# Patient Record
Sex: FEMALE | Race: WHITE | NOT HISPANIC OR LATINO | Employment: OTHER | URBAN - METROPOLITAN AREA
[De-identification: names, ages, dates, MRNs, and addresses within clinical notes are randomized per-mention and may not be internally consistent; named-entity substitution may affect disease eponyms.]

---

## 2017-02-13 ENCOUNTER — LAB VISIT (OUTPATIENT)
Dept: LAB | Facility: HOSPITAL | Age: 67
End: 2017-02-13
Attending: INTERNAL MEDICINE
Payer: MEDICARE

## 2017-02-13 ENCOUNTER — OFFICE VISIT (OUTPATIENT)
Dept: RHEUMATOLOGY | Facility: CLINIC | Age: 67
End: 2017-02-13
Payer: MEDICARE

## 2017-02-13 VITALS — WEIGHT: 256 LBS | BODY MASS INDEX: 37.92 KG/M2 | HEIGHT: 69 IN

## 2017-02-13 DIAGNOSIS — Z79.52 LONG TERM CURRENT USE OF SYSTEMIC STEROIDS: ICD-10-CM

## 2017-02-13 DIAGNOSIS — Z79.631 LONG TERM METHOTREXATE USER: ICD-10-CM

## 2017-02-13 DIAGNOSIS — D68.61 ANTI-PHOSPHOLIPID SYNDROME: ICD-10-CM

## 2017-02-13 DIAGNOSIS — M05.79 RHEUMATOID ARTHRITIS INVOLVING MULTIPLE SITES WITH POSITIVE RHEUMATOID FACTOR: ICD-10-CM

## 2017-02-13 DIAGNOSIS — E55.9 VITAMIN D INSUFFICIENCY: ICD-10-CM

## 2017-02-13 DIAGNOSIS — M15.9 GENERALIZED OSTEOARTHRITIS OF MULTIPLE SITES: ICD-10-CM

## 2017-02-13 DIAGNOSIS — M05.79 RHEUMATOID ARTHRITIS INVOLVING MULTIPLE SITES WITH POSITIVE RHEUMATOID FACTOR: Primary | ICD-10-CM

## 2017-02-13 DIAGNOSIS — E66.9 OBESITY (BMI 30-39.9): ICD-10-CM

## 2017-02-13 LAB
ALBUMIN SERPL BCP-MCNC: 3.6 G/DL
ALP SERPL-CCNC: 85 U/L
ALT SERPL W/O P-5'-P-CCNC: 17 U/L
ANION GAP SERPL CALC-SCNC: 13 MMOL/L
AST SERPL-CCNC: 22 U/L
BASOPHILS # BLD AUTO: 0.04 K/UL
BASOPHILS NFR BLD: 0.6 %
BILIRUB SERPL-MCNC: 0.4 MG/DL
BUN SERPL-MCNC: 12 MG/DL
CALCIUM SERPL-MCNC: 9.8 MG/DL
CHLORIDE SERPL-SCNC: 106 MMOL/L
CO2 SERPL-SCNC: 23 MMOL/L
CREAT SERPL-MCNC: 0.7 MG/DL
CRP SERPL-MCNC: 36.8 MG/L
DIFFERENTIAL METHOD: ABNORMAL
EOSINOPHIL # BLD AUTO: 0.6 K/UL
EOSINOPHIL NFR BLD: 10.1 %
ERYTHROCYTE [DISTWIDTH] IN BLOOD BY AUTOMATED COUNT: 15.6 %
ERYTHROCYTE [SEDIMENTATION RATE] IN BLOOD BY WESTERGREN METHOD: 25 MM/HR
EST. GFR  (AFRICAN AMERICAN): >60 ML/MIN/1.73 M^2
EST. GFR  (NON AFRICAN AMERICAN): >60 ML/MIN/1.73 M^2
GLUCOSE SERPL-MCNC: 89 MG/DL
HCT VFR BLD AUTO: 41.1 %
HGB BLD-MCNC: 12.7 G/DL
LYMPHOCYTES # BLD AUTO: 1.3 K/UL
LYMPHOCYTES NFR BLD: 20.8 %
MCH RBC QN AUTO: 30.9 PG
MCHC RBC AUTO-ENTMCNC: 30.9 %
MCV RBC AUTO: 100 FL
MONOCYTES # BLD AUTO: 0.7 K/UL
MONOCYTES NFR BLD: 11.5 %
NEUTROPHILS # BLD AUTO: 3.5 K/UL
NEUTROPHILS NFR BLD: 56.7 %
PLATELET # BLD AUTO: 247 K/UL
PMV BLD AUTO: 9.9 FL
POTASSIUM SERPL-SCNC: 4.3 MMOL/L
PROT SERPL-MCNC: 7 G/DL
RBC # BLD AUTO: 4.11 M/UL
SODIUM SERPL-SCNC: 142 MMOL/L
WBC # BLD AUTO: 6.25 K/UL

## 2017-02-13 PROCEDURE — 1125F AMNT PAIN NOTED PAIN PRSNT: CPT | Mod: S$GLB,,, | Performed by: INTERNAL MEDICINE

## 2017-02-13 PROCEDURE — 99999 PR PBB SHADOW E&M-EST. PATIENT-LVL III: CPT | Mod: PBBFAC,,, | Performed by: INTERNAL MEDICINE

## 2017-02-13 PROCEDURE — 1159F MED LIST DOCD IN RCRD: CPT | Mod: S$GLB,,, | Performed by: INTERNAL MEDICINE

## 2017-02-13 PROCEDURE — 99214 OFFICE O/P EST MOD 30 MIN: CPT | Mod: S$GLB,,, | Performed by: INTERNAL MEDICINE

## 2017-02-13 PROCEDURE — 1157F ADVNC CARE PLAN IN RCRD: CPT | Mod: S$GLB,,, | Performed by: INTERNAL MEDICINE

## 2017-02-13 PROCEDURE — 1160F RVW MEDS BY RX/DR IN RCRD: CPT | Mod: S$GLB,,, | Performed by: INTERNAL MEDICINE

## 2017-02-13 RX ORDER — KETOTIFEN FUMARATE 0.35 MG/ML
1 SOLUTION/ DROPS OPHTHALMIC 2 TIMES DAILY
COMMUNITY
End: 2018-01-02

## 2017-02-13 RX ORDER — TRAMADOL HYDROCHLORIDE 50 MG/1
TABLET ORAL
Qty: 120 TABLET | Refills: 0 | Status: SHIPPED | OUTPATIENT
Start: 2017-02-13 | End: 2017-03-17 | Stop reason: DRUGHIGH

## 2017-02-13 RX ORDER — PREDNISONE 5 MG/1
5 TABLET ORAL DAILY
Qty: 90 TABLET | Refills: 1 | Status: SHIPPED | OUTPATIENT
Start: 2017-02-13 | End: 2017-10-09 | Stop reason: SDUPTHER

## 2017-02-13 RX ORDER — CEFUROXIME AXETIL 500 MG/1
500 TABLET ORAL 2 TIMES DAILY
COMMUNITY
End: 2018-01-02 | Stop reason: ALTCHOICE

## 2017-02-13 NOTE — MR AVS SNAPSHOT
[facility]  [address]  [facility] [address]  Phone: [phone]  Fax: [phone]                  Cata Peoples   2017 2:00 PM   Office Visit    Description:  Female : 1950   Provider:  Rocio Argueta MD   Department:  [facility]           Reason for Visit     Disease Management           Diagnoses this Visit        Comments    Rheumatoid arthritis involving multiple sites with positive rheumatoid factor    -  Primary     Anti-phospholipid syndrome         Generalized osteoarthritis of multiple sites         Long term current use of systemic steroids         Vitamin D insufficiency         Long term methotrexate user         Obesity (BMI 30-39.9)                To Do List           Goals (5 Years of Data)     None      Follow-Up and Disposition     Return in about 6 months (around 2017).       These Medications        Disp Refills Start End    predniSONE (DELTASONE) 5 MG tablet 90 tablet 1 2017     Take 1 tablet (5 mg total) by mouth once daily. - Oral    Pharmacy: [facility]/pharmacy #[id number] - [place], [place] - [address] [facility] #: [phone]         [facility] On Call     [facility] On Call Nurse Care Line -  Assistance  Registered nurses in the [facility] On Call Center provide clinical advisement, health education, appointment booking, and other advisory services.  Call for this free service at [phone].             Medications           Message regarding Medications     Verify the changes and/or additions to your medication regime listed below are the same as discussed with your clinician today.  If any of these changes or additions are incorrect, please notify your healthcare provider.        CHANGE how you are taking these medications     Start Taking Instead of    predniSONE (DELTASONE) 5 MG tablet predniSONE (DELTASONE) 5 MG tablet    Dosage:  Take 1 tablet (5 mg total) by mouth once daily. Dosage:   Take 3 tablets (15 mg total) by mouth once daily.    Reason for Change:  Reorder       STOP taking these medications     levoFLOXacin (LEVAQUIN) 750 MG tablet Take 750 mg by mouth once daily.           Verify that the below list of medications is an accurate representation of the medications you are currently taking.  If none reported, the list may be blank. If incorrect, please contact your healthcare provider. Carry this list with you in case of emergency.           Current Medications     ascorbic acid (VITAMIN C) 1000 MG tablet Take 1,000 mg by mouth once daily.    cefUROXime (CEFTIN) 500 MG tablet Take 500 mg by mouth 2 (two) times daily.    FLUZONE HIGH-DOSE 2016-17, PF, 180 mcg/0.5 mL Syrg TO BE ADMINISTERED BY PHARMACIST FOR IMMUNIZATION    hydrocodone-acetaminophen 5-325mg (NORCO) 5-325 mg per tablet     ketotifen (ZADITOR) 0.025 % (0.035 %) ophthalmic solution Place 1 drop into both eyes 2 (two) times daily.    latanoprost 0.005 % ophthalmic solution Place 1 drop into both eyes nightly.    methotrexate 2.5 MG Tab TAKE 6 TABLETS (15 MG TOTAL) BY MOUTH EVERY 7 DAYS.    metoprolol tartrate (LOPRESSOR) 50 MG tablet Take 50 mg by mouth 2 (two) times daily.    multivitamin (ONE DAILY MULTIVITAMIN) per tablet Take 1 tablet by mouth once daily.    omeprazole (PRILOSEC) 40 MG capsule Take 1 capsule (40 mg total) by mouth once daily.    predniSONE (DELTASONE) 5 MG tablet Take 1 tablet (5 mg total) by mouth once daily.    propafenone (RHTHYMOL) 150 MG Tab 150 mg every 8 (eight) hours.     tramadol (ULTRAM) 50 mg tablet TAKE 1 TABLET BY MOUTH EVERY 6 HOURS AS NEEDED FOR PAIN    vitamin D 1000 units Tab Take 185 mg by mouth once daily.    warfarin (COUMADIN) 4 MG tablet     folic acid (FOLVITE) 1 MG tablet Take 1 tablet (1 mg total) by mouth once daily.    promethazine-codeine 6.25-10 mg/5 ml (PHENERGAN WITH CODEINE) 6.25-10 mg/5 mL syrup            Clinical Reference Information           Your Vitals Were     Height  "Weight BMI          5' 9" (1.753 m) 116.1 kg (256 lb) 37.8 kg/m2        Allergies as of 2/13/2017     Ambien [Zolpidem]      Immunizations Administered on Date of Encounter - 2/13/2017     None      Orders Placed During Today's Visit     Future Labs/Procedures Expected by Expires    DXA Bone Density Spine And Hip_Axial Skeleton  2/13/2017 2/13/2018    Vitamin D  2/13/2017 4/14/2018    XR Arthritis Survey  2/13/2017 2/13/2018    Recurring Lab Work Interval Expires    C-reactive protein   2/13/2018    CBC auto differential   2/13/2018    Comprehensive metabolic panel   2/13/2018    Sedimentation rate, manual   2/13/2018      Instructions    Please contact us if your joint issues change.  Keep up with your labs.        Language Assistance Services     ATTENTION: Language assistance services are available, free of charge. Please call [phone].      ATENCIÓN: Si habla español, tiene a capps disposición servicios gratuitos de asistencia lingüística. Llame al [phone].     DIRK Ý: N?u b?n nói Ti?ng Vi?t, có các d?ch v? h? tr? ngôn ng? mi?n phí dành cho b?n. G?i s? [phone].         [facility] complies with applicable Federal civil rights laws and does not discriminate on the basis of race, color, national origin, age, disability, or sex.        "

## 2017-02-13 NOTE — PROGRESS NOTES
Subjective:       Patient ID: Cata Peoples is a 66 y.o. female with sero+ccp+RA and APS (xarelto)    Chief Complaint: No chief complaint on file.    Returns for follow-up. Last seen on 11/1/16. Taking MTX and prednisone. Still doing very well RA wise. No joint pain other than the chronic LBP. Denies Raynaud's, dysphagia, tight skin, alopecia, oral ulcers, dry eyes, pleurisy, pericarditis, photosensitivity, skin rashes, new thromboses (had DVT behind right knee in May 2015 while on xarelto; is now on warfarin). AM stiffness (in low back mostly) x minimal. Tolerating MTX well. On prednisone 2.5 mg.  Does not want to change meds but would like something stronger for low back pain. Taking tramadol 50 mg & it helps just a little bit.  She usually takes them twice a day.  Would like to take more or something else for pain. Her main complaint is still low back pain. It's better now as it does not occur at rest but is there with walking and is her worse problem. She cannot walk much. Has had chronic low back pain and had injections in the past (summer of 2015 x 2) and they did not help. Also c/o financial issues. Pays $50 to see a specialist. Would like to move out her apts to every 6 months if possible.     Currently suffering from sinusitis and has facial pain and is on an antibiotic. No fever. No chills.  Otherwise she's ok.                Associated symptoms include fatigue and headaches. Pertinent negatives include no fever.         Current Outpatient Prescriptions   Medication Sig Dispense Refill    ascorbic acid (VITAMIN C) 1000 MG tablet Take 1,000 mg by mouth once daily.      cefUROXime (CEFTIN) 500 MG tablet Take 500 mg by mouth 2 (two) times daily.      FLUZONE HIGH-DOSE 2016-17, PF, 180 mcg/0.5 mL Syrg TO BE ADMINISTERED BY PHARMACIST FOR IMMUNIZATION  0    hydrocodone-acetaminophen 5-325mg (NORCO) 5-325 mg per tablet   0    ketotifen (ZADITOR) 0.025 % (0.035 %) ophthalmic solution Place 1 drop into  both eyes 2 (two) times daily.      latanoprost 0.005 % ophthalmic solution Place 1 drop into both eyes nightly.  12    methotrexate 2.5 MG Tab TAKE 6 TABLETS (15 MG TOTAL) BY MOUTH EVERY 7 DAYS. 90 tablet 3    metoprolol tartrate (LOPRESSOR) 50 MG tablet Take 50 mg by mouth 2 (two) times daily.  5    multivitamin (ONE DAILY MULTIVITAMIN) per tablet Take 1 tablet by mouth once daily.      omeprazole (PRILOSEC) 40 MG capsule Take 1 capsule (40 mg total) by mouth once daily. 30 capsule 3    predniSONE (DELTASONE) 5 MG tablet Take 3 tablets (15 mg total) by mouth once daily. 90 tablet 0    propafenone (RHTHYMOL) 150 MG Tab 150 mg every 8 (eight) hours.       tramadol (ULTRAM) 50 mg tablet TAKE 1 TABLET BY MOUTH EVERY 6 HOURS AS NEEDED FOR PAIN 120 tablet 0    vitamin D 1000 units Tab Take 185 mg by mouth once daily.      warfarin (COUMADIN) 4 MG tablet   3    folic acid (FOLVITE) 1 MG tablet Take 1 tablet (1 mg total) by mouth once daily. 90 tablet 3    promethazine-codeine 6.25-10 mg/5 ml (PHENERGAN WITH CODEINE) 6.25-10 mg/5 mL syrup        No current facility-administered medications for this visit.    Only taking prednisone 2.5 mg/day.        Review of patient's allergies indicates:   Allergen Reactions    Ambien [zolpidem]      Hallucinations     Past Medical History   Diagnosis Date    *Atrial fibrillation     Acid reflux     Allergy     Anxiety     Arthritis     Deep vein thrombosis     Depression     Pulmonary embolism      Past Surgical History   Procedure Laterality Date    Joint replacement       knee x4 replacements-2 R, and 2 L knee         Review of Systems   Constitutional: Positive for fatigue. Negative for diaphoresis, fever and unexpected weight change.   HENT: Positive for sinus pressure and tinnitus. Negative for dental problem, mouth sores and nosebleeds.         Dry mouth   Eyes: Negative.    Respiratory: Negative.  Negative for cough and chest tightness.    Cardiovascular:  "Negative.  Negative for leg swelling.   Gastrointestinal: Negative.  Negative for blood in stool, constipation, diarrhea and vomiting.   Endocrine: Negative.    Genitourinary: Positive for enuresis.        Overactive bladder; recurrent asymptomatic UTIs.   Musculoskeletal: Positive for back pain.   Skin: Negative.         Ecchymoses  Dry skin   Allergic/Immunologic: Negative.    Neurological: Positive for headaches.   Hematological: Bruises/bleeds easily.   Psychiatric/Behavioral: Negative.  Negative for dysphoric mood and sleep disturbance. The patient is not nervous/anxious.        Family History   Problem Relation Age of Onset    Cancer Mother      lung    Heart disease Father     Kidney disease Father      Social History   Substance Use Topics    Smoking status: Never Smoker    Smokeless tobacco: Never Used    Alcohol use No   Awaiting the birth of another grandchild around 1/6/17 & hopes it's a girl.      Objective:     Visit Vitals    Ht 5' 9" (1.753 m)    Wt 116.1 kg (256 lb)    BMI 37.8 kg/m2       Physical Exam   Constitutional: She is oriented to person, place, and time and well-developed, well-nourished, and in no distress. No distress.   HENT:   Head: Normocephalic and atraumatic.   Mouth/Throat: Oropharynx is clear and moist. No oropharyngeal exudate.   Wearing wig.     Eyes: Conjunctivae and EOM are normal. Pupils are equal, round, and reactive to light. No scleral icterus.   Neck: Neck supple. No JVD present. No tracheal deviation present. No thyromegaly present.   Cardiovascular: Normal rate, regular rhythm, normal heart sounds and intact distal pulses.  Exam reveals no gallop and no friction rub.    No murmur heard.  Pulmonary/Chest: Effort normal. No respiratory distress. She has no wheezes. She has no rales. She exhibits no tenderness.   Abdominal: Soft. She exhibits no distension and no mass. There is no splenomegaly or hepatomegaly. There is no tenderness. There is no rebound and no " guarding.   Lymphadenopathy:     She has no cervical adenopathy.        Right: No inguinal adenopathy present.        Left: No inguinal adenopathy present.   Neurological: She is alert and oriented to person, place, and time. She has normal reflexes. She displays normal reflexes. No cranial nerve deficit. She exhibits normal muscle tone.   Skin: Skin is warm and dry. She is not diaphoretic.     Psychiatric: Mood, memory, affect and judgment normal.   Musculoskeletal: She exhibits edema (1+ pedal & pretibial) and tenderness (R mid buttock).     Bilateral crepitus TMJ. Mild decrease in   rotation of the cervical spine. Shoulders are unremarkable. Elbows with mild R flexion contraction; minimal decrease in extension on L; Left wrist & 2,3 & 4th joints MCPs minimal SHT; PIPs with prominent Fam nodes and zigzagging. DIPs with prominent Heberden nodes and zigzagging.  Right hip with scar and with limitation of internal, external, rotation, flexion. Left also with some limitation to lesser degree.Knees with bilateral scars and bony hypertrophy and   crepitus with no synovitis. Ankles are with limited motion. No tenderness. Heels and toes are without metatarsalgia. Lumbar spine still with good flexion.               LABS:   2/13/17: CBC eos 10.1%;   11/1/16: ESR 12; CRP 4.4; CBC ok; CMP ok;   7/26/16: CPR 11.9; CBC ok; CMP ok;  1/6/16: ESR 23; CRP 5.7; Hg 11.6; CMP ok;   3/17/15: ESR 25; CRP 14.7; Hg 11; Ht 34.7; Alb. 3.3  12/17/14: ESR18;CRP 4; CBC ok; CMP ok; Vit D 23  9/17/14: ESR 15; CRP 4.9; CBC ok; CMP ok;   6/18/14: ESR 22; CRP 3.7; Vit D 26; +LAC; neg aCLA & beta 2 glyco;   12/9/13: ESR 30; CRP 20.5; CBC, CMP ok; Vit D 31;   9/26/23: +LAC  9/23/13: ESR 10; CRP 4.4; CBC, CMP ok;  6/20/13: ESR 30; CRP 18.4; CBC, CMP ok;   LAC neg; HEX +; aCLA & beta 2 neg;       DXA: 1/20/15: TLS 1.1; TFN -0.6; TTH -0.7    6/18/14: Arthritis Survey personally reviewed: OA & RA with Bilateral TKRs with ? loosening L TKR;   MRI  from 12/12: Extensive pannus in the right hip joint, suggesting rheumatoid arthritis. There is prominent associated marrow edema within the femoral head and a portion of the acetabulum. Small focus of decreased T1/T2 signal in the right femoral head, suggesting avascular necrosis (less than 25% involvement of the femoral head). Mild pannus in the left hip joint. No MR findings of osteonecrosis in the left femoral head.    Assessment:   Seropositive and CCP positive rheumatoid arthritis involving hands--very stable,    feet, elbows, and wrists, well controlled on prednisone 5/2.5 mg/d, MTX 15/wk and folic acid 1 mg/d.   Off Enbrel since 12/14.    Had multiple UTIs on it & cellulitis of one of her legs.    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     No hx of miscarriages or other thrombotic event   +LAC (neg aCLA & beta 2 glyc);      On coumadin; off xarelto due to cost.    Pedal edema   Due to venous insufficiency & weight.     Vitamin D insufficiency--recurrent;    normal DXA      Generalized osteoarthritis:   Low back pain secondary to degenerative disk disease especially involving L2-L3, but also L3-L4 with scoliosis and retrolisthesis.     Also with sacroiliac area tender points.   Status post bilateral knee replacements with repeat revision on the right January 2010 requiring blood transfusion.    S/P R THR 3/18/14.   Severe degenerative joint disease of the hands.     Fully functional    Depressive disorder    Hypertension.     GERD     Obesity       Plan:   Continue MTX 15 mg/wk & folic acid 1 mg/day.  Continue prednisone 2.5 mg daily.  Ok to double up on tramadol (take 2 twice a day) empirically and see how she does. She knows to back off if it makes her drowsy, etc.  Labs in 3 & 6 months including vitamin D level.  Schedule DXA and Arthritis Survey for next visit.  Ok to see her every  6 months for cost containment.  RTC 6 months or prn.

## 2017-02-27 DIAGNOSIS — M05.79 RHEUMATOID ARTHRITIS INVOLVING MULTIPLE SITES WITH POSITIVE RHEUMATOID FACTOR: ICD-10-CM

## 2017-02-27 RX ORDER — METHOTREXATE 2.5 MG/1
TABLET ORAL
Qty: 90 TABLET | Refills: 3 | Status: SHIPPED | OUTPATIENT
Start: 2017-02-27 | End: 2018-01-30 | Stop reason: SDUPTHER

## 2017-03-14 DIAGNOSIS — M05.79 RHEUMATOID ARTHRITIS INVOLVING MULTIPLE SITES WITH POSITIVE RHEUMATOID FACTOR: ICD-10-CM

## 2017-03-14 DIAGNOSIS — K21.9 GASTROESOPHAGEAL REFLUX DISEASE WITHOUT ESOPHAGITIS: ICD-10-CM

## 2017-03-14 RX ORDER — OMEPRAZOLE 40 MG/1
40 CAPSULE, DELAYED RELEASE ORAL DAILY
Qty: 30 CAPSULE | Refills: 3 | Status: SHIPPED | OUTPATIENT
Start: 2017-03-14 | End: 2017-07-17 | Stop reason: SDUPTHER

## 2017-03-14 RX ORDER — FOLIC ACID 1 MG/1
1 TABLET ORAL DAILY
Qty: 90 TABLET | Refills: 3 | Status: SHIPPED | OUTPATIENT
Start: 2017-03-14 | End: 2018-03-06 | Stop reason: SDUPTHER

## 2017-03-17 ENCOUNTER — TELEPHONE (OUTPATIENT)
Dept: RHEUMATOLOGY | Facility: CLINIC | Age: 67
End: 2017-03-17

## 2017-03-17 DIAGNOSIS — M25.50 PAIN, JOINT, MULTIPLE SITES: Primary | ICD-10-CM

## 2017-03-17 RX ORDER — TRAMADOL HYDROCHLORIDE 50 MG/1
TABLET ORAL
Qty: 240 TABLET | Refills: 3 | Status: SHIPPED | OUTPATIENT
Start: 2017-03-17 | End: 2017-10-19 | Stop reason: SDUPTHER

## 2017-03-17 NOTE — TELEPHONE ENCOUNTER
----- Message from Madhuri Corcoran sent at 3/17/2017  9:52 AM CDT -----  Contact: self@[employer]  Pt called in requesting a call back. Pt stated that the Tramadol she was prescribed has been helping a lot and she needs a new Rx for the higher dosage sent to her pharmacy, [facility] [address] in [place].    Call back is [phone]    Thank you

## 2017-03-17 NOTE — TELEPHONE ENCOUNTER
Please send tramadol [facility]mg tabs to [facility]      Returned call but no answer.    There are no 100 mg tabs (those are extended release and are more expensive).     She should use this drug sparingly as it is a narcotic and can cause addiction, drowsiness, and other side effects--some very serious.  While the maximum dose at her age is 8/day, I do not want her taking that much.  I will. She should not take any other narcotics with it including phenergan.

## 2017-05-19 ENCOUNTER — LAB VISIT (OUTPATIENT)
Dept: LAB | Facility: HOSPITAL | Age: 67
End: 2017-05-19
Attending: INTERNAL MEDICINE
Payer: MEDICARE

## 2017-05-19 DIAGNOSIS — E55.9 VITAMIN D INSUFFICIENCY: ICD-10-CM

## 2017-05-19 LAB — 25(OH)D3+25(OH)D2 SERPL-MCNC: 48 NG/ML

## 2017-05-19 PROCEDURE — 82306 VITAMIN D 25 HYDROXY: CPT

## 2017-05-19 PROCEDURE — 36415 COLL VENOUS BLD VENIPUNCTURE: CPT

## 2017-07-17 DIAGNOSIS — K21.9 GASTROESOPHAGEAL REFLUX DISEASE WITHOUT ESOPHAGITIS: ICD-10-CM

## 2017-07-17 RX ORDER — OMEPRAZOLE 40 MG/1
40 CAPSULE, DELAYED RELEASE ORAL DAILY
Qty: 30 CAPSULE | Refills: 3 | Status: SHIPPED | OUTPATIENT
Start: 2017-07-17 | End: 2017-11-15 | Stop reason: SDUPTHER

## 2017-08-16 ENCOUNTER — OFFICE VISIT (OUTPATIENT)
Dept: RHEUMATOLOGY | Facility: CLINIC | Age: 67
End: 2017-08-16
Payer: MEDICARE

## 2017-08-16 ENCOUNTER — LAB VISIT (OUTPATIENT)
Dept: LAB | Facility: HOSPITAL | Age: 67
End: 2017-08-16
Attending: INTERNAL MEDICINE
Payer: MEDICARE

## 2017-08-16 VITALS — WEIGHT: 243.31 LBS | HEIGHT: 69 IN | BODY MASS INDEX: 36.04 KG/M2

## 2017-08-16 DIAGNOSIS — Z79.631 LONG TERM METHOTREXATE USER: ICD-10-CM

## 2017-08-16 DIAGNOSIS — D68.61 ANTI-PHOSPHOLIPID SYNDROME: ICD-10-CM

## 2017-08-16 DIAGNOSIS — M54.50 ACUTE LEFT-SIDED LOW BACK PAIN WITHOUT SCIATICA: ICD-10-CM

## 2017-08-16 DIAGNOSIS — M05.79 RHEUMATOID ARTHRITIS INVOLVING MULTIPLE SITES WITH POSITIVE RHEUMATOID FACTOR: ICD-10-CM

## 2017-08-16 DIAGNOSIS — M05.79 RHEUMATOID ARTHRITIS INVOLVING MULTIPLE SITES WITH POSITIVE RHEUMATOID FACTOR: Primary | ICD-10-CM

## 2017-08-16 DIAGNOSIS — M15.9 GENERALIZED OSTEOARTHRITIS OF MULTIPLE SITES: ICD-10-CM

## 2017-08-16 DIAGNOSIS — E66.9 OBESITY (BMI 30-39.9): ICD-10-CM

## 2017-08-16 DIAGNOSIS — Z79.52 LONG TERM CURRENT USE OF SYSTEMIC STEROIDS: ICD-10-CM

## 2017-08-16 LAB
ALBUMIN SERPL BCP-MCNC: 3.6 G/DL
ALP SERPL-CCNC: 89 U/L
ALT SERPL W/O P-5'-P-CCNC: 12 U/L
ANION GAP SERPL CALC-SCNC: 10 MMOL/L
AST SERPL-CCNC: 18 U/L
BASOPHILS # BLD AUTO: 0.05 K/UL
BASOPHILS NFR BLD: 0.6 %
BILIRUB SERPL-MCNC: 0.7 MG/DL
BUN SERPL-MCNC: 21 MG/DL
CALCIUM SERPL-MCNC: 10.2 MG/DL
CHLORIDE SERPL-SCNC: 107 MMOL/L
CO2 SERPL-SCNC: 24 MMOL/L
CREAT SERPL-MCNC: 0.8 MG/DL
CRP SERPL-MCNC: 46.3 MG/L
DIFFERENTIAL METHOD: ABNORMAL
EOSINOPHIL # BLD AUTO: 0.3 K/UL
EOSINOPHIL NFR BLD: 3.3 %
ERYTHROCYTE [DISTWIDTH] IN BLOOD BY AUTOMATED COUNT: 16 %
ERYTHROCYTE [SEDIMENTATION RATE] IN BLOOD BY WESTERGREN METHOD: 36 MM/HR
EST. GFR  (AFRICAN AMERICAN): >60 ML/MIN/1.73 M^2
EST. GFR  (NON AFRICAN AMERICAN): >60 ML/MIN/1.73 M^2
GLUCOSE SERPL-MCNC: 89 MG/DL
HCT VFR BLD AUTO: 38 %
HGB BLD-MCNC: 12.2 G/DL
LYMPHOCYTES # BLD AUTO: 0.8 K/UL
LYMPHOCYTES NFR BLD: 10.5 %
MCH RBC QN AUTO: 31.1 PG
MCHC RBC AUTO-ENTMCNC: 32.1 G/DL
MCV RBC AUTO: 97 FL
MONOCYTES # BLD AUTO: 1.1 K/UL
MONOCYTES NFR BLD: 13.5 %
NEUTROPHILS # BLD AUTO: 5.8 K/UL
NEUTROPHILS NFR BLD: 72 %
PLATELET # BLD AUTO: 251 K/UL
PMV BLD AUTO: 9.5 FL
POTASSIUM SERPL-SCNC: 4.2 MMOL/L
PROT SERPL-MCNC: 7.2 G/DL
RBC # BLD AUTO: 3.92 M/UL
SODIUM SERPL-SCNC: 141 MMOL/L
WBC # BLD AUTO: 7.99 K/UL

## 2017-08-16 PROCEDURE — 99214 OFFICE O/P EST MOD 30 MIN: CPT | Mod: S$GLB,,, | Performed by: INTERNAL MEDICINE

## 2017-08-16 PROCEDURE — 99999 PR PBB SHADOW E&M-EST. PATIENT-LVL III: CPT | Mod: PBBFAC,,, | Performed by: INTERNAL MEDICINE

## 2017-08-16 PROCEDURE — 3008F BODY MASS INDEX DOCD: CPT | Mod: S$GLB,,, | Performed by: INTERNAL MEDICINE

## 2017-08-16 PROCEDURE — 1159F MED LIST DOCD IN RCRD: CPT | Mod: S$GLB,,, | Performed by: INTERNAL MEDICINE

## 2017-08-16 RX ORDER — NICOTINE POLACRILEX 2 MG
GUM BUCCAL
COMMUNITY

## 2017-08-16 RX ORDER — CYCLOBENZAPRINE HCL 10 MG
10 TABLET ORAL 3 TIMES DAILY PRN
Qty: 90 TABLET | Refills: 0 | Status: SHIPPED | OUTPATIENT
Start: 2017-08-16 | End: 2018-01-02

## 2017-08-16 ASSESSMENT — ROUTINE ASSESSMENT OF PATIENT INDEX DATA (RAPID3)
PSYCHOLOGICAL DISTRESS SCORE: 0
PATIENT GLOBAL ASSESSMENT SCORE: 10
MDHAQ FUNCTION SCORE: 1.1
AM STIFFNESS SCORE: 0, NO
TOTAL RAPID3 SCORE: 7.89
FATIGUE SCORE: 10
PAIN SCORE: 10

## 2017-08-16 NOTE — PATIENT INSTRUCTIONS
Try cyclobenzaprine 1/2 tablet for muscle relaxation.  May take a whole tablet if needed.  Do not exceed 30 mg/day.  Be aware it may make drowsy.  Will dry mouth.

## 2017-08-16 NOTE — PROGRESS NOTES
Subjective:       Patient ID: Cata Peoples is a 67 y.o. female with sero+ccp+RA and APS (warfarin)    Chief Complaint: No chief complaint on file.    Returns for follow-up. Last seen on 2/13/17. Taking MTX 15 mg/wk + folic acid 1 mg/d and prednisone 2.5 mg.  Has not had labs done until this AM. Her main complaint is low back pain. She twisted her back getting out of the car on 8/11 and now has left sided low back and buttock pain. Has hx of chronic LBP but it had improved on its own. Taking 2 tramadol in AM and 1 usually in PM.  Recent LBP triggered by her twisting movement. Denies all other joint pain or swelling. Denies Raynaud's, dysphagia, tight skin, alopecia, oral ulcers, dry eyes, pleurisy, pericarditis, photosensitivity, skin rashes, new thromboses (had DVT behind right knee in May 2015 while on xarelto; is now on warfarin). AM stiffness (in low back mostly) x minimal. Tolerating MTX well. No rashes, no stomatitis. On prednisone 2.5 mg.  Does not want to change meds.     Still  c/o financial issues. Would like to continue coming every 6 months. Promises to keep up with labs.                 Associated symptoms include fatigue and headaches. Pertinent negatives include no fever.         Current Outpatient Prescriptions   Medication Sig Dispense Refill    ascorbic acid (VITAMIN C) 1000 MG tablet Take 1,000 mg by mouth once daily.      cefUROXime (CEFTIN) 500 MG tablet Take 500 mg by mouth 2 (two) times daily.      FLUZONE HIGH-DOSE 2016-17, PF, 180 mcg/0.5 mL Syrg TO BE ADMINISTERED BY PHARMACIST FOR IMMUNIZATION  0    folic acid (FOLVITE) 1 MG tablet Take 1 tablet (1 mg total) by mouth once daily. 90 tablet 3    hydrocodone-acetaminophen 5-325mg (NORCO) 5-325 mg per tablet   0    ketotifen (ZADITOR) 0.025 % (0.035 %) ophthalmic solution Place 1 drop into both eyes 2 (two) times daily.      latanoprost 0.005 % ophthalmic solution Place 1 drop into both eyes nightly.  12    methotrexate 2.5 MG Tab  TAKE 6 TABLETS (15 MG TOTAL) BY MOUTH EVERY 7 DAYS. 90 tablet 3    metoprolol tartrate (LOPRESSOR) 50 MG tablet Take 50 mg by mouth 2 (two) times daily.  5    multivitamin (ONE DAILY MULTIVITAMIN) per tablet Take 1 tablet by mouth once daily.      omeprazole (PRILOSEC) 40 MG capsule Take 1 capsule (40 mg total) by mouth once daily. 30 capsule 3    predniSONE (DELTASONE) 5 MG tablet Take 1 tablet (5 mg total) by mouth once daily. 90 tablet 1    promethazine-codeine 6.25-10 mg/5 ml (PHENERGAN WITH CODEINE) 6.25-10 mg/5 mL syrup       propafenone (RHTHYMOL) 150 MG Tab 150 mg every 8 (eight) hours.       tramadol (ULTRAM) 50 mg tablet Use sparingly: 1-2 tablets 4 x/day; combine with acetaminophen. 240 tablet 3    vitamin D 1000 units Tab Take 185 mg by mouth once daily.      warfarin (COUMADIN) 4 MG tablet   3     No current facility-administered medications for this visit.    Only taking prednisone 2.5 mg/day.    Not taking hydrocodone, just tramadol.        Review of patient's allergies indicates:   Allergen Reactions    Ambien [zolpidem]      Hallucinations     Past Medical History:   Diagnosis Date    *Atrial fibrillation     Acid reflux     Allergy     Anxiety     Arthritis     Deep vein thrombosis     Depression     Pulmonary embolism      Past Surgical History:   Procedure Laterality Date    JOINT REPLACEMENT      knee x4 replacements-2 R, and 2 L knee         Review of Systems   Constitutional: Positive for fatigue. Negative for diaphoresis, fever and unexpected weight change.   HENT: Positive for tinnitus. Negative for dental problem, mouth sores, nosebleeds and sinus pressure.         Dry mouth   Eyes: Negative.    Respiratory: Negative.  Negative for cough and chest tightness.    Cardiovascular: Negative.  Negative for leg swelling.   Gastrointestinal: Negative.  Negative for blood in stool, constipation, diarrhea and vomiting.   Endocrine: Negative.    Genitourinary: Positive for enuresis.  "       Overactive bladder; recurrent asymptomatic UTIs.   Musculoskeletal: Positive for back pain.   Skin: Negative.         Ecchymoses  Dry skin   Allergic/Immunologic: Negative.    Neurological: Positive for headaches.   Hematological: Bruises/bleeds easily.   Psychiatric/Behavioral: Negative.  Negative for dysphoric mood and sleep disturbance (sleeps very well). The patient is not nervous/anxious.        Family History   Problem Relation Age of Onset    Cancer Mother      lung    Heart disease Father     Kidney disease Father      Social History   Substance Use Topics    Smoking status: Never Smoker    Smokeless tobacco: Never Used    Alcohol use No   Awaiting the birth of another grandchild around 1/6/17 & hopes it's a girl.      Objective:     Ht 5' 9" (1.753 m)   Wt 110.4 kg (243 lb 4.8 oz)   BMI 35.93 kg/m²   Ht 5' 9" (1.753 m)   Wt 110.4 kg (243 lb 4.8 oz)   BMI 35.93 kg/m²     Physical Exam   Constitutional: She is oriented to person, place, and time and well-developed, well-nourished, and in no distress. No distress.   HENT:   Head: Normocephalic and atraumatic.   Mouth/Throat: Oropharynx is clear and moist. No oropharyngeal exudate.        Eyes: Conjunctivae and EOM are normal. Pupils are equal, round, and reactive to light. No scleral icterus.   Neck: Neck supple. No JVD present. No tracheal deviation present. No thyromegaly present.   Cardiovascular: Normal rate, regular rhythm, normal heart sounds and intact distal pulses.  Exam reveals no gallop and no friction rub.    No murmur heard.  Pulmonary/Chest: Effort normal. No respiratory distress. She has no wheezes. She has no rales. She exhibits no tenderness.   Abdominal: Soft. She exhibits no distension and no mass. There is no splenomegaly or hepatomegaly. There is no tenderness. There is no rebound and no guarding.   Lymphadenopathy:     She has no cervical adenopathy.        Right: No inguinal adenopathy present.        Left: No inguinal " adenopathy present.   Neurological: She is alert and oriented to person, place, and time. She has normal reflexes. She displays normal reflexes. No cranial nerve deficit. She exhibits normal muscle tone.   Skin: Skin is warm and dry. She is not diaphoretic.     Psychiatric: Mood, memory, affect and judgment normal.   Musculoskeletal: She exhibits edema (1+ pedal & pretibial) and tenderness (L buttock,).     Bilateral crepitus TMJ. Mild decrease in   rotation of the cervical spine. Shoulders are unremarkable. Elbows with mild R flexion contraction; minimal decrease in extension on L; Left wrist & 2,3 & 4th joints MCPs minimal SHT; PIPs with prominent Fam nodes and zigzagging. DIPs with prominent Heberden nodes and zigzagging.  Right hip with scar and with limitation of internal, external, rotation, flexion. Left also with some limitation to lesser degree.Knees with bilateral scars and bony hypertrophy and   crepitus with no synovitis. Ankles are with limited motion. No tenderness. Heels and toes are without metatarsalgia. Lumbar spine no midline tenderness.               LABS:   5/19/17: Vit D 48;   2/13/17: ESR 25; CRP 36.8; CBC eos 10.1%; CMP ok;   11/1/16: ESR 12; CRP 4.4; CBC ok; CMP ok;   7/26/16: CPR 11.9; CBC ok; CMP ok;  1/6/16: ESR 23; CRP 5.7; Hg 11.6; CMP ok;   3/17/15: ESR 25; CRP 14.7; Hg 11; Ht 34.7; Alb. 3.3  12/17/14: ESR18;CRP 4; CBC ok; CMP ok; Vit D 23  9/17/14: ESR 15; CRP 4.9; CBC ok; CMP ok;   6/18/14: ESR 22; CRP 3.7; Vit D 26; +LAC; neg aCLA & beta 2 glyco;   12/9/13: ESR 30; CRP 20.5; CBC, CMP ok; Vit D 31;   9/26/23: +LAC  9/23/13: ESR 10; CRP 4.4; CBC, CMP ok;  6/20/13: ESR 30; CRP 18.4; CBC, CMP ok;   LAC neg; HEX +; aCLA & beta 2 neg;       DXA: 1/20/15: TLS 1.1; TFN -0.6; TTH -0.7    6/18/14: Arthritis Survey personally reviewed: OA & RA with Bilateral TKRs with ? loosening L TKR;   MRI from 12/12: Extensive pannus in the right hip joint, suggesting rheumatoid arthritis. There is  prominent associated marrow edema within the femoral head and a portion of the acetabulum. Small focus of decreased T1/T2 signal in the right femoral head, suggesting avascular necrosis (less than 25% involvement of the femoral head). Mild pannus in the left hip joint. No MR findings of osteonecrosis in the left femoral head.    Assessment:   Seropositive and CCP positive rheumatoid arthritis involving hands--very stable,    feet, elbows, and wrists, well controlled on prednisone 5/2.5 mg/d, MTX 15/wk and folic acid 1 mg/d.   Off Enbrel since 12/14.    Had multiple UTIs on it & cellulitis of one of her legs.    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     No hx of miscarriages or other thrombotic event   +LAC (neg aCLA & beta 2 glyc);      On coumadin; off xarelto due to cost.    Pedal edema   Due to venous insufficiency & weight.     Vitamin D insufficiency--recurrent;    normal DXA      Generalized osteoarthritis:   Low back pain secondary to degenerative disk disease especially involving L2-L3, but also L3-L4 with scoliosis and retrolisthesis.     Also with sacroiliac area tender points.   Status post bilateral knee replacements with repeat revision on the right January 2010 requiring blood transfusion.    S/P R THR 3/18/14.   Severe degenerative joint disease of the hands.     Fully functional    Depressive disorder    Hypertension.     GERD     Obesity       Plan:   Continue MTX 15 mg/wk & folic acid 1 mg/day.  Continue prednisone 2.5 mg daily.  Ok continue tramadol --max 6/days. She knows to back off if it makes her drowsy, etc.  Discussed re-starting cyclobenzaprine as she requested a rx.  She had been on it in the past without any problems and it may have helped her low back pain.  Reviewed side effects and toxicities of this drug, especially in conjunction with tramadol. Handout  provided.  Reviewed seratonin syndrome.   Check today's labs.  Labs in 3 & 6 months.  Needs DXA and Arthritis Survey --wants to postpone-doesn't feel like dealing with it today.   Ok to see her every 6 months for cost containment.  RTC 6 months or prn.

## 2017-10-09 DIAGNOSIS — M05.79 RHEUMATOID ARTHRITIS INVOLVING MULTIPLE SITES WITH POSITIVE RHEUMATOID FACTOR: ICD-10-CM

## 2017-10-09 RX ORDER — PREDNISONE 5 MG/1
5 TABLET ORAL DAILY
Qty: 90 TABLET | Refills: 1 | Status: SHIPPED | OUTPATIENT
Start: 2017-10-09 | End: 2018-06-11 | Stop reason: SDUPTHER

## 2017-10-19 RX ORDER — TRAMADOL HYDROCHLORIDE 50 MG/1
TABLET ORAL
Qty: 240 TABLET | Refills: 3 | Status: SHIPPED | OUTPATIENT
Start: 2017-10-19 | End: 2018-06-26 | Stop reason: SDUPTHER

## 2017-11-15 DIAGNOSIS — K21.9 GASTROESOPHAGEAL REFLUX DISEASE WITHOUT ESOPHAGITIS: ICD-10-CM

## 2017-11-15 RX ORDER — OMEPRAZOLE 40 MG/1
40 CAPSULE, DELAYED RELEASE ORAL DAILY
Qty: 30 CAPSULE | Refills: 3 | Status: SHIPPED | OUTPATIENT
Start: 2017-11-15 | End: 2018-07-16 | Stop reason: SDUPTHER

## 2018-01-01 NOTE — PROGRESS NOTES
Subjective:       Patient ID: Cata Peoples is a 67 y.o. female with sero+ccp+RA and APS (warfarin)    Chief Complaint: No chief complaint on file.    Returns for follow-up. Last seen on 8/16/17. Still taking MTX 15 mg/wk + folic acid 1 mg/d and prednisone 2.5 mg/d. Tolerating MTX well. No rashes, no stomatitis. Has not had labs done until this AM again.  She has no RA/peripheral joint complaints. AM stiffness is 30 minutes. Denies Raynaud's, dysphagia, tight skin, alopecia, oral ulcers, dry eyes, pleurisy, pericarditis, photosensitivity, skin rashes, new thromboses (had DVT behind right knee in May 2015 while on xarelto; is now on warfarin). AM stiffness (in low back mostly) x minimal.     Her main problem continues as low mid to low back pain. She has difficulty standing up straight. She was seen in the ED of [facility] on 11/20/17 for low back pain associated with left sided substernal chest pain that responded to NTG given in ED. The dx on the discharge sheet were: chest pain, atrial fibrillation and wedge compression of T11. Unclear whether this was a new fx.                  Associated symptoms include fatigue and headaches. Pertinent negatives include no fever.         Current Outpatient Prescriptions   Medication Sig Dispense Refill    acetaminophen (TYLENOL) 500 MG tablet Take 500 mg by mouth 2 (two) times daily.      biotin 1 mg Cap Take by mouth.      FLUZONE HIGH-DOSE 2016-17, PF, 180 mcg/0.5 mL Syrg TO BE ADMINISTERED BY PHARMACIST FOR IMMUNIZATION  0    folic acid (FOLVITE) 1 MG tablet Take 1 tablet (1 mg total) by mouth once daily. 90 tablet 3    latanoprost 0.005 % ophthalmic solution Place 1 drop into both eyes nightly.  12    methotrexate 2.5 MG Tab TAKE 6 TABLETS (15 MG TOTAL) BY MOUTH EVERY 7 DAYS. 90 tablet 3    metoprolol tartrate (LOPRESSOR) 50 MG tablet Take 50 mg by mouth 2 (two) times daily.  5    multivitamin (ONE DAILY MULTIVITAMIN) per tablet Take 1 tablet by mouth once daily.       omeprazole (PRILOSEC) 40 MG capsule Take 1 capsule (40 mg total) by mouth once daily. 30 capsule 3    predniSONE (DELTASONE) 5 MG tablet Take 1 tablet (5 mg total) by mouth once daily. 90 tablet 1    propafenone (RHTHYMOL) 150 MG Tab 150 mg every 8 (eight) hours.       tramadol (ULTRAM) 50 mg tablet Use sparingly: 1-2 tablets 4 x/day; combine with acetaminophen. 240 tablet 3    vitamin D 1000 units Tab Take 185 mg by mouth once daily.      warfarin (COUMADIN) 4 MG tablet   3     No current facility-administered medications for this visit.    Only taking prednisone 2.5 mg/day.      Review of patient's allergies indicates:   Allergen Reactions    Ambien [zolpidem]      Hallucinations     Past Medical History:   Diagnosis Date    *Atrial fibrillation     Acid reflux     Allergy     Anxiety     Arthritis     Deep vein thrombosis     Depression     Pulmonary embolism      Past Surgical History:   Procedure Laterality Date    JOINT REPLACEMENT      knee x4 replacements-2 R, and 2 L knee         Review of Systems   Constitutional: Positive for fatigue. Negative for diaphoresis, fever and unexpected weight change.   HENT: Positive for tinnitus. Negative for dental problem, mouth sores, nosebleeds and sinus pressure.         Dry mouth   Eyes: Negative.    Respiratory: Negative.  Negative for cough and chest tightness.    Cardiovascular: Negative.  Negative for leg swelling.   Gastrointestinal: Negative.  Negative for blood in stool, constipation, diarrhea and vomiting.   Endocrine: Negative.    Genitourinary: Positive for enuresis.        Overactive bladder; recurrent asymptomatic UTIs.   Musculoskeletal: Positive for back pain.   Skin: Negative.         Ecchymoses  Dry skin   Allergic/Immunologic: Negative.    Neurological: Positive for headaches.   Hematological: Bruises/bleeds easily.   Psychiatric/Behavioral: Negative.  Negative for dysphoric mood and sleep disturbance (sleeps very well). The patient is  "not nervous/anxious.        Family History   Problem Relation Age of Onset    Cancer Mother      lung    Heart disease Father     Kidney disease Father      Social History   Substance Use Topics    Smoking status: Never Smoker    Smokeless tobacco: Never Used    Alcohol use No         Objective:     BP (!) 140/80   Pulse 75   Ht 5' 9" (1.753 m)   Wt 106.5 kg (234 lb 12.8 oz)   BMI 34.67 kg/m²   BP (!) 140/80   Pulse 75   Ht 5' 9" (1.753 m)   Wt 106.5 kg (234 lb 12.8 oz)   BMI 34.67 kg/m²     Physical Exam   Constitutional: She is oriented to person, place, and time and well-developed, well-nourished, and in no distress. No distress.   HENT:   Head: Normocephalic and atraumatic.   Mouth/Throat: Oropharynx is clear and moist. No oropharyngeal exudate.        Eyes: Conjunctivae and EOM are normal. Pupils are equal, round, and reactive to light. No scleral icterus.   Neck: Neck supple. No JVD present. No tracheal deviation present. No thyromegaly present.   Cardiovascular: Normal rate, regular rhythm, normal heart sounds and intact distal pulses.  Exam reveals no gallop and no friction rub.    No murmur heard.  Pulmonary/Chest: Effort normal. No respiratory distress. She has no wheezes. She has no rales. She exhibits no tenderness.   Abdominal: Soft. She exhibits no distension and no mass. There is no splenomegaly or hepatomegaly. There is no tenderness. There is no rebound and no guarding.   Lymphadenopathy:     She has no cervical adenopathy.        Right: No inguinal adenopathy present.        Left: No inguinal adenopathy present.   Neurological: She is alert and oriented to person, place, and time. She has normal reflexes. She displays normal reflexes. No cranial nerve deficit. She exhibits normal muscle tone.   Skin: Skin is warm and dry. She is not diaphoretic.     Psychiatric: Mood, memory, affect and judgment normal.   Musculoskeletal: She exhibits edema (1+ pedal & pretibial) and tenderness.   No " synovitis anywhere.  Bilateral crepitus TMJ. Mild decrease in   rotation of the cervical spine. Shoulders are unremarkable. Elbows with mild R flexion contraction; minimal decrease in extension on L; Left wrist & 2,3 & 4th joints MCPs minimal SHT; PIPs with prominent Fam nodes and zigzagging. DIPs with prominent Heberden nodes and zigzagging.  Right hip with scar and with limitation of internal, external, rotation, flexion. Left also with some limitation to lesser degree.Knees with bilateral scars and bony hypertrophy and   crepitus with no synovitis. Ankles are with limited motion. No tenderness. Heels and toes are without metatarsalgia. Lumbar spine no midline tenderness.               LABS:   1/2/18: ESR pending:CRP 3.3; CBC ok; CMP ok  8/16/17: ESR 36; CRP 46.3; CBC ok; CMP  5/19/17: Vit D 48;   2/13/17: ESR 25; CRP 36.8; CBC eos 10.1%; CMP ok;   11/1/16: ESR 12; CRP 4.4; CBC ok; CMP ok;   7/26/16: CPR 11.9; CBC ok; CMP ok;  1/6/16: ESR 23; CRP 5.7; Hg 11.6; CMP ok;   3/17/15: ESR 25; CRP 14.7; Hg 11; Ht 34.7; Alb. 3.3  12/17/14: ESR18;CRP 4; CBC ok; CMP ok; Vit D 23  9/17/14: ESR 15; CRP 4.9; CBC ok; CMP ok;   6/18/14: ESR 22; CRP 3.7; Vit D 26; +LAC; neg aCLA & beta 2 glyco;   12/9/13: ESR 30; CRP 20.5; CBC, CMP ok; Vit D 31;   9/26/23: +LAC  9/23/13: ESR 10; CRP 4.4; CBC, CMP ok;  6/20/13: ESR 30; CRP 18.4; CBC, CMP ok;   LAC neg; HEX +; aCLA & beta 2 neg;       DXA: 1/20/15: TLS 1.1; TFN -0.6; TTH -0.7    6/18/14: Arthritis Survey personally reviewed: OA & RA with Bilateral TKRs with ? loosening L TKR;   MRI from 12/12: Extensive pannus in the right hip joint, suggesting rheumatoid arthritis. There is prominent associated marrow edema within the femoral head and a portion of the acetabulum. Small focus of decreased T1/T2 signal in the right femoral head, suggesting avascular necrosis (less than 25% involvement of the femoral head). Mild pannus in the left hip joint. No MR findings of osteonecrosis in  the left femoral head.    Assessment:   Seropositive and CCP positive rheumatoid arthritis involving hands--very stable,    feet, elbows, and wrists,    well controlled on prednisone 2.5 mg/d, MTX 15/wk and folic acid 1 mg/d.   Off Enbrel since 12/14.    Had multiple UTIs on it & cellulitis of one of her legs.    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     No hx of miscarriages or other thrombotic event   +LAC (neg aCLA & beta 2 glyc);      On coumadin; off xarelto due to cost.    Pedal edema   Due to venous insufficiency & weight.     Wedge compression of T 11 by hx by imaging 11/20/17   CD could not open    Vitamin D insufficiency--recurrent;    normal DXA in past     Generalized osteoarthritis:   Low back pain secondary to degenerative disk disease especially involving L2-L3, but also L3-L4 with scoliosis and retrolisthesis.     Also with sacroiliac area tender points.   Status post bilateral knee replacements with repeat revision on the right January 2010 requiring blood transfusion.    S/P R THR 3/18/14.   Severe degenerative joint disease of the hands.     Fully functional    Depressive disorder    Hypertension.     GERD     Obesity       Plan:   Stressed need for labs every 3 months again.  Continue MTX 15 mg/wk & folic acid 1 mg/day.  Change prednisone 2.5 mg alternating with nothing.  She will contact us if this fails to control her sxs and we will order 1 mg prednisone and go slower.  Labs in 3 & 6 months.  Discussed osteoporosis and chest pain.  Will need rx which she states Dr. López will arrange.  Discussed Ca, vitamin D and options for rx of OP.  Handouts provided.  Ok to see her every 6 months for cost containment.  She will contact us prn problems  RTC 6 months or prn.      Addendum: [facility] 1/15/18: T spine 3 views: compression fx w anterior wedging of the T11 vertebral body;  atherosclerotic disease; rotatory scoliosis & spondylosis of the osteopenic/osteoporotic thoracic & upper lumbar spine.    Called and spoke to patient about getting DXA. She prefers to have it done at [facility]. We will send her the paperwork.

## 2018-01-02 ENCOUNTER — OFFICE VISIT (OUTPATIENT)
Dept: RHEUMATOLOGY | Facility: CLINIC | Age: 68
End: 2018-01-02
Payer: MEDICARE

## 2018-01-02 ENCOUNTER — LAB VISIT (OUTPATIENT)
Dept: LAB | Facility: HOSPITAL | Age: 68
End: 2018-01-02
Attending: INTERNAL MEDICINE
Payer: MEDICARE

## 2018-01-02 VITALS
HEART RATE: 75 BPM | SYSTOLIC BLOOD PRESSURE: 140 MMHG | BODY MASS INDEX: 34.78 KG/M2 | DIASTOLIC BLOOD PRESSURE: 80 MMHG | WEIGHT: 234.81 LBS | HEIGHT: 69 IN

## 2018-01-02 DIAGNOSIS — D68.61 ANTI-PHOSPHOLIPID SYNDROME: ICD-10-CM

## 2018-01-02 DIAGNOSIS — Z79.52 LONG TERM CURRENT USE OF SYSTEMIC STEROIDS: ICD-10-CM

## 2018-01-02 DIAGNOSIS — S22.000A COMPRESSION FRACTURE OF BODY OF THORACIC VERTEBRA: ICD-10-CM

## 2018-01-02 DIAGNOSIS — Z79.631 LONG TERM METHOTREXATE USER: ICD-10-CM

## 2018-01-02 DIAGNOSIS — M05.79 RHEUMATOID ARTHRITIS INVOLVING MULTIPLE SITES WITH POSITIVE RHEUMATOID FACTOR: Primary | ICD-10-CM

## 2018-01-02 DIAGNOSIS — M15.9 GENERALIZED OSTEOARTHRITIS OF MULTIPLE SITES: ICD-10-CM

## 2018-01-02 DIAGNOSIS — M80.80XA OTHER OSTEOPOROSIS WITH CURRENT PATHOLOGICAL FRACTURE, INITIAL ENCOUNTER: ICD-10-CM

## 2018-01-02 DIAGNOSIS — M05.79 RHEUMATOID ARTHRITIS INVOLVING MULTIPLE SITES WITH POSITIVE RHEUMATOID FACTOR: ICD-10-CM

## 2018-01-02 LAB
ALBUMIN SERPL BCP-MCNC: 3.6 G/DL
ALP SERPL-CCNC: 92 U/L
ALT SERPL W/O P-5'-P-CCNC: 15 U/L
ANION GAP SERPL CALC-SCNC: 9 MMOL/L
AST SERPL-CCNC: 18 U/L
BASOPHILS # BLD AUTO: 0.11 K/UL
BASOPHILS NFR BLD: 1.5 %
BILIRUB SERPL-MCNC: 0.5 MG/DL
BUN SERPL-MCNC: 16 MG/DL
CALCIUM SERPL-MCNC: 10.3 MG/DL
CHLORIDE SERPL-SCNC: 104 MMOL/L
CO2 SERPL-SCNC: 27 MMOL/L
CREAT SERPL-MCNC: 0.8 MG/DL
CRP SERPL-MCNC: 3.3 MG/L
DIFFERENTIAL METHOD: ABNORMAL
EOSINOPHIL # BLD AUTO: 0.3 K/UL
EOSINOPHIL NFR BLD: 4.3 %
ERYTHROCYTE [DISTWIDTH] IN BLOOD BY AUTOMATED COUNT: 14.8 %
ERYTHROCYTE [SEDIMENTATION RATE] IN BLOOD BY WESTERGREN METHOD: 14 MM/HR
EST. GFR  (AFRICAN AMERICAN): >60 ML/MIN/1.73 M^2
EST. GFR  (NON AFRICAN AMERICAN): >60 ML/MIN/1.73 M^2
GLUCOSE SERPL-MCNC: 80 MG/DL
HCT VFR BLD AUTO: 42.3 %
HGB BLD-MCNC: 13.2 G/DL
IMM GRANULOCYTES # BLD AUTO: 0.02 K/UL
IMM GRANULOCYTES NFR BLD AUTO: 0.3 %
LYMPHOCYTES # BLD AUTO: 1.5 K/UL
LYMPHOCYTES NFR BLD: 21.3 %
MCH RBC QN AUTO: 31.3 PG
MCHC RBC AUTO-ENTMCNC: 31.2 G/DL
MCV RBC AUTO: 100 FL
MONOCYTES # BLD AUTO: 0.8 K/UL
MONOCYTES NFR BLD: 10.8 %
NEUTROPHILS # BLD AUTO: 4.5 K/UL
NEUTROPHILS NFR BLD: 61.8 %
NRBC BLD-RTO: 0 /100 WBC
PLATELET # BLD AUTO: 296 K/UL
PMV BLD AUTO: 9.8 FL
POTASSIUM SERPL-SCNC: 4.9 MMOL/L
PROT SERPL-MCNC: 7.1 G/DL
RBC # BLD AUTO: 4.22 M/UL
SODIUM SERPL-SCNC: 140 MMOL/L
WBC # BLD AUTO: 7.24 K/UL

## 2018-01-02 PROCEDURE — 85651 RBC SED RATE NONAUTOMATED: CPT

## 2018-01-02 PROCEDURE — 36415 COLL VENOUS BLD VENIPUNCTURE: CPT

## 2018-01-02 PROCEDURE — 85025 COMPLETE CBC W/AUTO DIFF WBC: CPT

## 2018-01-02 PROCEDURE — 99999 PR PBB SHADOW E&M-EST. PATIENT-LVL III: CPT | Mod: PBBFAC,,, | Performed by: INTERNAL MEDICINE

## 2018-01-02 PROCEDURE — 86140 C-REACTIVE PROTEIN: CPT

## 2018-01-02 PROCEDURE — 80053 COMPREHEN METABOLIC PANEL: CPT

## 2018-01-02 PROCEDURE — 99214 OFFICE O/P EST MOD 30 MIN: CPT | Mod: S$GLB,,, | Performed by: INTERNAL MEDICINE

## 2018-01-02 RX ORDER — ACETAMINOPHEN 500 MG
500 TABLET ORAL 2 TIMES DAILY
COMMUNITY

## 2018-01-02 ASSESSMENT — ROUTINE ASSESSMENT OF PATIENT INDEX DATA (RAPID3)
PSYCHOLOGICAL DISTRESS SCORE: 0
PAIN SCORE: 9
FATIGUE SCORE: 9
WHEN YOU AWAKENED IN THE MORNING OVER THE LAST WEEK, PLEASE INDICATE THE AMOUNT OF TIME IT TAKES UNTIL YOU ARE AS LIMBER AS YOU WILL BE FOR THE DAY: 30 MINUTES
TOTAL RAPID3 SCORE: 7.22
PATIENT GLOBAL ASSESSMENT SCORE: 8
MDHAQ FUNCTION SCORE: 1.4
AM STIFFNESS SCORE: 1, YES

## 2018-01-02 NOTE — PATIENT INSTRUCTIONS
You may want to alternate prednisone 2.5 mg with no prednisone every other day.    If you have a problem then contact us and we will prescribe 1 mg tablets and go slowly.    You will ultimately require something to build up your bones.    Have your primary care MD check your vitamin D level before beginning a drug to build up bone.    Take calcium through the diet. You need 1200 -1500 mg of calcium/day.  A regular non dairy diet gets you 250 mg of calcium.    Read labels.  A serving of milk has about 300 mg; a serving of almond milk ([place] Breeze) has 455 mg.     Make an appointment with your cardiologist to evaluate your chest pain and  Response to NTG.

## 2018-01-30 DIAGNOSIS — M05.79 RHEUMATOID ARTHRITIS INVOLVING MULTIPLE SITES WITH POSITIVE RHEUMATOID FACTOR: ICD-10-CM

## 2018-01-30 RX ORDER — METHOTREXATE 2.5 MG/1
TABLET ORAL
Qty: 90 TABLET | Refills: 3 | Status: SHIPPED | OUTPATIENT
Start: 2018-01-30 | End: 2018-08-09 | Stop reason: SDUPTHER

## 2018-03-06 DIAGNOSIS — M05.79 RHEUMATOID ARTHRITIS INVOLVING MULTIPLE SITES WITH POSITIVE RHEUMATOID FACTOR: ICD-10-CM

## 2018-03-06 RX ORDER — FOLIC ACID 1 MG/1
1 TABLET ORAL DAILY
Qty: 90 TABLET | Refills: 3 | Status: SHIPPED | OUTPATIENT
Start: 2018-03-06 | End: 2019-03-06 | Stop reason: SDUPTHER

## 2018-03-12 ENCOUNTER — TELEPHONE (OUTPATIENT)
Dept: RHEUMATOLOGY | Facility: CLINIC | Age: 68
End: 2018-03-12

## 2018-06-11 DIAGNOSIS — M05.79 RHEUMATOID ARTHRITIS INVOLVING MULTIPLE SITES WITH POSITIVE RHEUMATOID FACTOR: ICD-10-CM

## 2018-06-11 RX ORDER — PREDNISONE 5 MG/1
5 TABLET ORAL DAILY
Qty: 90 TABLET | Refills: 1 | Status: SHIPPED | OUTPATIENT
Start: 2018-06-11 | End: 2018-08-09 | Stop reason: SDUPTHER

## 2018-06-27 RX ORDER — TRAMADOL HYDROCHLORIDE 50 MG/1
TABLET ORAL
Qty: 240 TABLET | Refills: 3 | Status: SHIPPED | OUTPATIENT
Start: 2018-06-27 | End: 2019-03-08 | Stop reason: SDUPTHER

## 2018-07-16 DIAGNOSIS — K21.9 GASTROESOPHAGEAL REFLUX DISEASE WITHOUT ESOPHAGITIS: ICD-10-CM

## 2018-07-16 RX ORDER — OMEPRAZOLE 40 MG/1
40 CAPSULE, DELAYED RELEASE ORAL DAILY
Qty: 30 CAPSULE | Refills: 3 | Status: SHIPPED | OUTPATIENT
Start: 2018-07-16 | End: 2018-11-12 | Stop reason: SDUPTHER

## 2018-08-09 ENCOUNTER — OFFICE VISIT (OUTPATIENT)
Dept: RHEUMATOLOGY | Facility: CLINIC | Age: 68
End: 2018-08-09
Payer: MEDICARE

## 2018-08-09 VITALS
BODY MASS INDEX: 34.02 KG/M2 | DIASTOLIC BLOOD PRESSURE: 76 MMHG | HEART RATE: 80 BPM | SYSTOLIC BLOOD PRESSURE: 138 MMHG | WEIGHT: 229.69 LBS | HEIGHT: 69 IN

## 2018-08-09 DIAGNOSIS — M15.9 GENERALIZED OSTEOARTHRITIS OF MULTIPLE SITES: ICD-10-CM

## 2018-08-09 DIAGNOSIS — Z79.52 LONG TERM CURRENT USE OF SYSTEMIC STEROIDS: ICD-10-CM

## 2018-08-09 DIAGNOSIS — S22.000A COMPRESSION FRACTURE OF BODY OF THORACIC VERTEBRA: ICD-10-CM

## 2018-08-09 DIAGNOSIS — Z79.631 LONG TERM METHOTREXATE USER: ICD-10-CM

## 2018-08-09 DIAGNOSIS — D68.61 ANTI-PHOSPHOLIPID SYNDROME: ICD-10-CM

## 2018-08-09 DIAGNOSIS — E55.9 VITAMIN D INSUFFICIENCY: ICD-10-CM

## 2018-08-09 DIAGNOSIS — E66.9 OBESITY (BMI 30-39.9): ICD-10-CM

## 2018-08-09 DIAGNOSIS — M05.79 RHEUMATOID ARTHRITIS INVOLVING MULTIPLE SITES WITH POSITIVE RHEUMATOID FACTOR: Primary | ICD-10-CM

## 2018-08-09 PROCEDURE — 99214 OFFICE O/P EST MOD 30 MIN: CPT | Mod: S$GLB,,, | Performed by: INTERNAL MEDICINE

## 2018-08-09 PROCEDURE — 99999 PR PBB SHADOW E&M-EST. PATIENT-LVL V: CPT | Mod: PBBFAC,,, | Performed by: INTERNAL MEDICINE

## 2018-08-09 RX ORDER — METHOTREXATE 2.5 MG/1
TABLET ORAL
Qty: 120 TABLET | Refills: 1 | Status: SHIPPED | OUTPATIENT
Start: 2018-08-09 | End: 2019-01-31 | Stop reason: SDUPTHER

## 2018-08-09 RX ORDER — PREDNISONE 5 MG/1
5 TABLET ORAL DAILY
Qty: 90 TABLET | Refills: 1 | Status: SHIPPED | OUTPATIENT
Start: 2018-08-09 | End: 2018-12-18 | Stop reason: SDUPTHER

## 2018-08-09 ASSESSMENT — ROUTINE ASSESSMENT OF PATIENT INDEX DATA (RAPID3)
TOTAL RAPID3 SCORE: 5.28
AM STIFFNESS SCORE: 1, YES
PATIENT GLOBAL ASSESSMENT SCORE: 7.5
PSYCHOLOGICAL DISTRESS SCORE: 0
FATIGUE SCORE: 5.5
MDHAQ FUNCTION SCORE: 1
PAIN SCORE: 5

## 2018-08-09 NOTE — PATIENT INSTRUCTIONS
Try to alternate 5 mg of prednisone with 2.5 mg and stay on this for at least one month before attempting to go down to 2.5 mg/d.    Increase MTX to 8 tablets once a week.  Continue folic acid 1 mg/d.    Keep moving.     Get DXA.

## 2018-08-09 NOTE — PROGRESS NOTES
Subjective:       Patient ID: Cata Peoples is a 68 y.o. female with sero+ccp+RA and APS (warfarin)    Chief Complaint: No chief complaint on file.    Returns for follow-up. Last seen on 1/2/18 (last labs). Gets labs from [facility].       Takes prednisone 5 every morning. Got out of her wheelchair this month and is currently using a cane to walk around. She feels great. On MTX and prednisone 5 mg/d. Wants to stay on this regimen. Steroids also help her low back pain too and she feels her quality of life is improved on steroids and does not want to change therapy despite side effects.   Tolerating MTX well. No rashes, no stomatitis. She has no RA/peripheral joint complaints. AM stiffness is <60 minutes mostly in low back.. Denies Raynaud's, dysphagia, tight skin, alopecia, oral ulcers, dry eyes, pleurisy, pericarditis, photosensitivity, skin rashes, new thromboses (had DVT behind right knee in May 2015 while on xarelto; is now on warfarin).                   She reports no joint swelling. Pertinent negatives include no dysuria, fatigue, fever, myalgias or headaches.         Current Outpatient Prescriptions   Medication Sig Dispense Refill    acetaminophen (TYLENOL) 500 MG tablet Take 500 mg by mouth 2 (two) times daily.      biotin 1 mg Cap Take by mouth.      FLUZONE HIGH-DOSE 2016-17, PF, 180 mcg/0.5 mL Syrg TO BE ADMINISTERED BY PHARMACIST FOR IMMUNIZATION  0    folic acid (FOLVITE) 1 MG tablet Take 1 tablet (1 mg total) by mouth once daily. 90 tablet 3    latanoprost 0.005 % ophthalmic solution Place 1 drop into both eyes nightly.  12    methotrexate 2.5 MG Tab TAKE 6 TABLETS (15 MG TOTAL) BY MOUTH EVERY 7 DAYS. 90 tablet 3    metoprolol tartrate (LOPRESSOR) 50 MG tablet Take 50 mg by mouth 2 (two) times daily.  5    multivitamin (ONE DAILY MULTIVITAMIN) per tablet Take 1 tablet by mouth once daily.      omeprazole (PRILOSEC) 40 MG capsule Take 1 capsule (40 mg total) by mouth once daily. 30 capsule 3     predniSONE (DELTASONE) 5 MG tablet Take 1 tablet (5 mg total) by mouth once daily. 90 tablet 1    propafenone (RHTHYMOL) 150 MG Tab 150 mg every 8 (eight) hours.       traMADol (ULTRAM) 50 mg tablet Use sparingly: 1-2 tablets 4 x/day; combine with acetaminophen. 240 tablet 3    vitamin D 1000 units Tab Take 185 mg by mouth once daily.      warfarin (COUMADIN) 4 MG tablet   3     No current facility-administered medications for this visit.    Only taking prednisone 2.5 mg/day.      Review of patient's allergies indicates:   Allergen Reactions    Ambien [zolpidem]      Hallucinations     Past Medical History:   Diagnosis Date    *Atrial fibrillation     Acid reflux     Allergy     Anxiety     Arthritis     Deep vein thrombosis     Depression     Pulmonary embolism      Past Surgical History:   Procedure Laterality Date    JOINT REPLACEMENT      knee x4 replacements-2 R, and 2 L knee         Review of Systems   Constitutional: Negative for diaphoresis, fatigue, fever and unexpected weight change.   HENT: Positive for tinnitus. Negative for dental problem, mouth sores, nosebleeds and sinus pressure.         Dry mouth   Eyes: Negative.  Negative for pain, itching and visual disturbance.   Respiratory: Negative.  Negative for cough, chest tightness and wheezing.    Cardiovascular: Negative.  Negative for chest pain and leg swelling.   Gastrointestinal: Negative.  Negative for blood in stool, constipation, diarrhea and vomiting.   Endocrine: Negative.    Genitourinary: Positive for enuresis. Negative for dysuria, hematuria and urgency.        Overactive bladder; recurrent asymptomatic UTIs.   Musculoskeletal: Positive for back pain and gait problem (uses cane). Negative for arthralgias, joint swelling, myalgias, neck pain and neck stiffness.   Skin: Negative.  Negative for pallor and rash.        Ecchymoses  Dry skin   Allergic/Immunologic: Negative.    Neurological: Negative for dizziness, weakness,  "light-headedness and headaches.   Hematological: Bruises/bleeds easily.   Psychiatric/Behavioral: Negative.  Negative for dysphoric mood and sleep disturbance (sleeps very well). The patient is not nervous/anxious.        Family History   Problem Relation Age of Onset    Cancer Mother         lung    Heart disease Father     Kidney disease Father      Social History   Substance Use Topics    Smoking status: Never Smoker    Smokeless tobacco: Never Used    Alcohol use No         Objective:     /76   Pulse 80   Ht 5' 9" (1.753 m)   Wt 104.2 kg (229 lb 11.2 oz)   BMI 33.92 kg/m²     Physical Exam   Constitutional: She is oriented to person, place, and time and well-developed, well-nourished, and in no distress. No distress.   HENT:   Head: Normocephalic and atraumatic.   Mouth/Throat: Oropharynx is clear and moist. No oropharyngeal exudate.        Eyes: Conjunctivae and EOM are normal. Pupils are equal, round, and reactive to light. No scleral icterus.   Neck: Neck supple. No JVD present. No tracheal deviation present. No thyromegaly present.   Cardiovascular: Normal rate, regular rhythm, normal heart sounds and intact distal pulses.  Exam reveals no gallop and no friction rub.    No murmur heard.  Pulmonary/Chest: Effort normal and breath sounds normal. No respiratory distress. She has no wheezes. She has no rales. She exhibits no tenderness.   Abdominal: Soft. She exhibits no distension and no mass. There is no splenomegaly or hepatomegaly. There is no tenderness. There is no rebound and no guarding.   Lymphadenopathy:     She has no cervical adenopathy.        Right: No inguinal adenopathy present.        Left: No inguinal adenopathy present.   Neurological: She is alert and oriented to person, place, and time. She has normal reflexes. She displays normal reflexes. No cranial nerve deficit. She exhibits normal muscle tone.   Skin: Skin is warm and dry. She is not diaphoretic.     Psychiatric: Mood, " memory, affect and judgment normal.   Musculoskeletal: She exhibits edema (1+ pedal & pretibial) and deformity (Rheumatic changes). She exhibits no tenderness.   No synovitis anywhere.  Bilateral crepitus TMJ. Mild decrease in   rotation of the cervical spine. Shoulders are unremarkable. Elbows with mild R flexion contraction; minimal decrease in extension on L; Left wrist & 2,3 & 4th joints MCPs minimal SHT; PIPs with prominent Fam nodes and zigzagging. DIPs with prominent Heberden nodes and zigzagging.  Right hip with scar and with limitation of internal, external, rotation, flexion. Left also with some limitation to lesser degree.Knees with bilateral scars and bony hypertrophy and   crepitus with no synovitis. Ankles are with limited motion. No tenderness. Heels and toes are without metatarsalgia. Lumbar spine no midline tenderness.               LABS:   Labs from [facility] reviewed: ok;  1/2/18: ESR 14;CRP 3.3; CBC ok; CMP ok  8/16/17: ESR 36; CRP 46.3; CBC ok; CMP  5/19/17: Vit D 48;   2/13/17: ESR 25; CRP 36.8; CBC eos 10.1%; CMP ok;   11/1/16: ESR 12; CRP 4.4; CBC ok; CMP ok;   7/26/16: CPR 11.9; CBC ok; CMP ok;  1/6/16: ESR 23; CRP 5.7; Hg 11.6; CMP ok;   3/17/15: ESR 25; CRP 14.7; Hg 11; Ht 34.7; Alb. 3.3  12/17/14: ESR18;CRP 4; CBC ok; CMP ok; Vit D 23  9/17/14: ESR 15; CRP 4.9; CBC ok; CMP ok;   6/18/14: ESR 22; CRP 3.7; Vit D 26; +LAC; neg aCLA & beta 2 glyco;   12/9/13: ESR 30; CRP 20.5; CBC, CMP ok; Vit D 31;   9/26/23: +LAC  9/23/13: ESR 10; CRP 4.4; CBC, CMP ok;  6/20/13: ESR 30; CRP 18.4; CBC, CMP ok;   LAC neg; HEX +; aCLA & beta 2 neg;     DXA: 1/15/18 ([facility]): T spine 3 views: compression fx w anterior wedging of the T11 vertebral body; atherosclerotic disease; rotatory scoliosis & spondylosis of the osteopenic/osteoporotic thoracic & upper lumbar spine.    DXA: 1/20/15: TLS 1.1; TFN -0.6; TTH -0.7    6/18/14: Arthritis Survey personally reviewed: OA & RA with Bilateral TKRs with ? loosening L  TKR;   MRI from 12/12: Extensive pannus in the right hip joint, suggesting rheumatoid arthritis. There is prominent associated marrow edema within the femoral head and a portion of the acetabulum. Small focus of decreased T1/T2 signal in the right femoral head, suggesting avascular necrosis (less than 25% involvement of the femoral head). Mild pannus in the left hip joint. No MR findings of osteonecrosis in the left femoral head.    Assessment:   Seropositive and CCP positive rheumatoid arthritis involving hands--very stable,    feet, elbows, and wrists,    well controlled on prednisone 5 mg/d, MTX 15/wk and folic acid 1 mg/d.   Instructed pt to try and cut down prednisone alternating 5mg and 2.5mg   Increased MTX to 20/wk for better control of RA   Off Enbrel since 12/14.    Had multiple UTIs on it & cellulitis of one of her legs.    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     No hx of miscarriages or other thrombotic event   +LAC (neg aCLA & beta 2 glyc);      On coumadin - INR checks [id number]; off xarelto due to cost.    Pedal edema   Due to venous insufficiency & weight.     Wedge compression of T 11 by hx by imaging 11/20/17   CD could not open    Vitamin D insufficiency--recurrent;    normal DXA in past    Repeat DXA before next vist    Generalized osteoarthritis:   Low back pain secondary to degenerative disk disease especially involving L2-L3, but also L3-L4 with scoliosis and retrolisthesis.     Also with sacroiliac area tender points.   Status post bilateral knee replacements with repeat revision on the right January 2010 requiring blood transfusion.    S/P R THR 3/18/14.   Severe degenerative joint disease of the hands.     Fully functional    Depressive disorder    Hypertension.     GERD     Obesity       Plan:   Continue MTX 15 mg/wk & folic acid 1 mg/day.  Try alternating  prednisone 5 mg with prednisone 2.5 mg; wants 5 mg tablets.  Labs in 3 & 6 months.  Discussed risk of fx again. Does not want rx despite being on steroids.   She will get us DXA.  Ok to see her every 6 months for cost containment.  She will contact us prn problems  RTC 6 months or prn.      Addendum: 8/1/18 ([facility]): ESR 8; CRP 4.9 (4.9); CBC ok; hi indices; CMP ok

## 2018-08-13 ENCOUNTER — TELEPHONE (OUTPATIENT)
Dept: RHEUMATOLOGY | Facility: CLINIC | Age: 68
End: 2018-08-13

## 2018-08-13 NOTE — TELEPHONE ENCOUNTER
Spoke to patient. She confirmed that she had a Dexa scan outside of [facility] and is waiting for that facility to send the results to us. I told her I will let Dr. Argueta know.

## 2018-11-12 DIAGNOSIS — K21.9 GASTROESOPHAGEAL REFLUX DISEASE WITHOUT ESOPHAGITIS: ICD-10-CM

## 2018-11-12 RX ORDER — OMEPRAZOLE 40 MG/1
40 CAPSULE, DELAYED RELEASE ORAL DAILY
Qty: 30 CAPSULE | Refills: 3 | Status: SHIPPED | OUTPATIENT
Start: 2018-11-12 | End: 2019-02-14 | Stop reason: SDUPTHER

## 2018-11-20 LAB
ALBUMIN SERPL-MCNC: 4.4 G/DL (ref 3.5–4.8)
ALBUMIN/GLOB SERPL: 2 {RATIO} (ref 1.2–2.2)
ALP SERPL-CCNC: 82 IU/L (ref 39–117)
ALT SERPL-CCNC: 19 IU/L (ref 0–32)
AST SERPL-CCNC: 28 IU/L (ref 0–40)
BASOPHILS # BLD AUTO: 0 X10E3/UL (ref 0–0.2)
BASOPHILS NFR BLD AUTO: 1 %
BILIRUB SERPL-MCNC: 0.5 MG/DL (ref 0–1.2)
BUN SERPL-MCNC: 17 MG/DL (ref 8–27)
BUN/CREAT SERPL: 25 (ref 12–28)
CALCIUM SERPL-MCNC: 10.3 MG/DL (ref 8.7–10.3)
CHLORIDE SERPL-SCNC: 104 MMOL/L (ref 96–106)
CO2 SERPL-SCNC: 24 MMOL/L (ref 20–29)
CREAT SERPL-MCNC: 0.69 MG/DL (ref 0.57–1)
CRP SERPL-MCNC: 4.6 MG/L (ref 0–4.9)
EGFR IF AFRICAN AMERICAN: 102 ML/MIN/1.73
EOSINOPHIL # BLD AUTO: 0.1 X10E3/UL (ref 0–0.4)
EOSINOPHIL NFR BLD AUTO: 2 %
ERYTHROCYTE [DISTWIDTH] IN BLOOD BY AUTOMATED COUNT: 15.9 % (ref 12.3–15.4)
ERYTHROCYTE [SEDIMENTATION RATE] IN BLOOD BY WESTERGREN METHOD: 11 MM/HR (ref 0–40)
EST. GFR  (NON AFRICAN AMERICAN): 88 ML/MIN/1.73
GLOBULIN SER CALC-MCNC: 2.2 G/DL (ref 1.5–4.5)
GLUCOSE SERPL-MCNC: 100 MG/DL (ref 65–99)
HCT VFR BLD AUTO: 39 % (ref 34–46.6)
HGB BLD-MCNC: 12.5 G/DL (ref 11.1–15.9)
IMM GRANULOCYTES # BLD: 0 X10E3/UL (ref 0–0.1)
IMM GRANULOCYTES NFR BLD: 0 %
LYMPHOCYTES # BLD AUTO: 0.7 X10E3/UL (ref 0.7–3.1)
LYMPHOCYTES NFR BLD AUTO: 11 %
MCH RBC QN AUTO: 31.1 PG (ref 26.6–33)
MCHC RBC AUTO-ENTMCNC: 32.1 G/DL (ref 31.5–35.7)
MCV RBC AUTO: 97 FL (ref 79–97)
MONOCYTES # BLD AUTO: 0.5 X10E3/UL (ref 0.1–0.9)
MONOCYTES NFR BLD AUTO: 8 %
NEUTROPHILS # BLD AUTO: 4.8 X10E3/UL (ref 1.4–7)
NEUTROPHILS NFR BLD AUTO: 78 %
PLATELET # BLD AUTO: 243 X10E3/UL (ref 150–379)
POTASSIUM SERPL-SCNC: 5.1 MMOL/L (ref 3.5–5.2)
PROT SERPL-MCNC: 6.6 G/DL (ref 6–8.5)
RBC # BLD AUTO: 4.02 X10E6/UL (ref 3.77–5.28)
SODIUM SERPL-SCNC: 144 MMOL/L (ref 134–144)
WBC # BLD AUTO: 6.1 X10E3/UL (ref 3.4–10.8)

## 2018-12-18 DIAGNOSIS — M05.79 RHEUMATOID ARTHRITIS INVOLVING MULTIPLE SITES WITH POSITIVE RHEUMATOID FACTOR: ICD-10-CM

## 2018-12-18 RX ORDER — PREDNISONE 5 MG/1
5 TABLET ORAL DAILY
Qty: 90 TABLET | Refills: 1 | Status: SHIPPED | OUTPATIENT
Start: 2018-12-18 | End: 2019-07-14 | Stop reason: SDUPTHER

## 2019-01-31 DIAGNOSIS — M05.79 RHEUMATOID ARTHRITIS INVOLVING MULTIPLE SITES WITH POSITIVE RHEUMATOID FACTOR: ICD-10-CM

## 2019-01-31 RX ORDER — METHOTREXATE 2.5 MG/1
TABLET ORAL
Qty: 120 TABLET | Refills: 1 | Status: SHIPPED | OUTPATIENT
Start: 2019-01-31 | End: 2019-04-30

## 2019-02-14 DIAGNOSIS — K21.9 GASTROESOPHAGEAL REFLUX DISEASE WITHOUT ESOPHAGITIS: ICD-10-CM

## 2019-02-14 RX ORDER — OMEPRAZOLE 40 MG/1
40 CAPSULE, DELAYED RELEASE ORAL DAILY
Qty: 30 CAPSULE | Refills: 3 | Status: SHIPPED | OUTPATIENT
Start: 2019-02-14 | End: 2019-03-15 | Stop reason: SDUPTHER

## 2019-03-06 DIAGNOSIS — M05.79 RHEUMATOID ARTHRITIS INVOLVING MULTIPLE SITES WITH POSITIVE RHEUMATOID FACTOR: ICD-10-CM

## 2019-03-06 RX ORDER — FOLIC ACID 1 MG/1
TABLET ORAL
Qty: 90 TABLET | Refills: 0 | Status: SHIPPED | OUTPATIENT
Start: 2019-03-06 | End: 2019-06-10 | Stop reason: SDUPTHER

## 2019-03-08 RX ORDER — TRAMADOL HYDROCHLORIDE 50 MG/1
TABLET ORAL
Qty: 240 TABLET | Refills: 3 | Status: SHIPPED | OUTPATIENT
Start: 2019-03-08 | End: 2019-11-14 | Stop reason: SDUPTHER

## 2019-03-14 DIAGNOSIS — K21.9 GASTROESOPHAGEAL REFLUX DISEASE WITHOUT ESOPHAGITIS: ICD-10-CM

## 2019-03-15 RX ORDER — OMEPRAZOLE 40 MG/1
CAPSULE, DELAYED RELEASE ORAL
Qty: 30 CAPSULE | Refills: 0 | Status: SHIPPED | OUTPATIENT
Start: 2019-03-15 | End: 2019-04-13 | Stop reason: SDUPTHER

## 2019-04-09 ENCOUNTER — TELEPHONE (OUTPATIENT)
Dept: RHEUMATOLOGY | Facility: CLINIC | Age: 69
End: 2019-04-09

## 2019-04-09 DIAGNOSIS — Z79.631 LONG TERM METHOTREXATE USER: ICD-10-CM

## 2019-04-09 DIAGNOSIS — Z79.899 LONG-TERM USE OF HIGH-RISK MEDICATION: ICD-10-CM

## 2019-04-09 DIAGNOSIS — M05.79 RHEUMATOID ARTHRITIS INVOLVING MULTIPLE SITES WITH POSITIVE RHEUMATOID FACTOR: Primary | ICD-10-CM

## 2019-04-09 NOTE — TELEPHONE ENCOUNTER
----- Message from Marsha Blanco sent at 4/9/2019 10:57 AM CDT -----  Contact: Self  Pt called requesting a return call back regarding her lab blood testing orders that she would like have sent over to [facility]. Pt could be reached at [phone]

## 2019-04-10 ENCOUNTER — TELEPHONE (OUTPATIENT)
Dept: RHEUMATOLOGY | Facility: CLINIC | Age: 69
End: 2019-04-10

## 2019-04-10 DIAGNOSIS — M05.79 RHEUMATOID ARTHRITIS INVOLVING MULTIPLE SITES WITH POSITIVE RHEUMATOID FACTOR: ICD-10-CM

## 2019-04-10 DIAGNOSIS — Z79.899 LONG-TERM USE OF HIGH-RISK MEDICATION: ICD-10-CM

## 2019-04-10 DIAGNOSIS — Z79.631 LONG TERM METHOTREXATE USER: Primary | ICD-10-CM

## 2019-04-13 DIAGNOSIS — K21.9 GASTROESOPHAGEAL REFLUX DISEASE WITHOUT ESOPHAGITIS: ICD-10-CM

## 2019-04-13 RX ORDER — OMEPRAZOLE 40 MG/1
CAPSULE, DELAYED RELEASE ORAL
Qty: 30 CAPSULE | Refills: 0 | Status: SHIPPED | OUTPATIENT
Start: 2019-04-13 | End: 2019-05-11 | Stop reason: SDUPTHER

## 2019-04-23 LAB
ALBUMIN SERPL-MCNC: 4.3 G/DL (ref 3.5–4.8)
ALBUMIN/GLOB SERPL: 2 {RATIO} (ref 1.2–2.2)
ALP SERPL-CCNC: 91 IU/L (ref 39–117)
ALT SERPL-CCNC: 11 IU/L (ref 0–32)
AST SERPL-CCNC: 21 IU/L (ref 0–40)
BASOPHILS # BLD AUTO: 0 X10E3/UL (ref 0–0.2)
BASOPHILS NFR BLD AUTO: 0 %
BILIRUB SERPL-MCNC: 0.4 MG/DL (ref 0–1.2)
BUN SERPL-MCNC: 17 MG/DL (ref 8–27)
BUN/CREAT SERPL: 23 (ref 12–28)
CALCIUM SERPL-MCNC: 9.6 MG/DL (ref 8.7–10.3)
CHLORIDE SERPL-SCNC: 103 MMOL/L (ref 96–106)
CO2 SERPL-SCNC: 23 MMOL/L (ref 20–29)
CREAT SERPL-MCNC: 0.74 MG/DL (ref 0.57–1)
CRP SERPL-MCNC: 5.3 MG/L (ref 0–4.9)
EOSINOPHIL # BLD AUTO: 0.2 X10E3/UL (ref 0–0.4)
EOSINOPHIL NFR BLD AUTO: 2 %
ERYTHROCYTE [DISTWIDTH] IN BLOOD BY AUTOMATED COUNT: 16.8 % (ref 12.3–15.4)
ERYTHROCYTE [SEDIMENTATION RATE] IN BLOOD BY WESTERGREN METHOD: 14 MM/HR (ref 0–40)
GLOBULIN SER CALC-MCNC: 2.1 G/DL (ref 1.5–4.5)
GLUCOSE SERPL-MCNC: 98 MG/DL (ref 65–99)
HCT VFR BLD AUTO: 38.8 % (ref 34–46.6)
HGB BLD-MCNC: 12 G/DL (ref 11.1–15.9)
IMM GRANULOCYTES # BLD AUTO: 0 X10E3/UL (ref 0–0.1)
IMM GRANULOCYTES NFR BLD AUTO: 0 %
LYMPHOCYTES # BLD AUTO: 0.9 X10E3/UL (ref 0.7–3.1)
LYMPHOCYTES NFR BLD AUTO: 11 %
MCH RBC QN AUTO: 29.9 PG (ref 26.6–33)
MCHC RBC AUTO-ENTMCNC: 30.9 G/DL (ref 31.5–35.7)
MCV RBC AUTO: 97 FL (ref 79–97)
MONOCYTES # BLD AUTO: 0.7 X10E3/UL (ref 0.1–0.9)
MONOCYTES NFR BLD AUTO: 8 %
NEUTROPHILS # BLD AUTO: 6.5 X10E3/UL (ref 1.4–7)
NEUTROPHILS NFR BLD AUTO: 79 %
PLATELET # BLD AUTO: 263 X10E3/UL (ref 150–379)
POTASSIUM SERPL-SCNC: 4.5 MMOL/L (ref 3.5–5.2)
PROT SERPL-MCNC: 6.4 G/DL (ref 6–8.5)
RBC # BLD AUTO: 4.01 X10E6/UL (ref 3.77–5.28)
SODIUM SERPL-SCNC: 143 MMOL/L (ref 134–144)
WBC # BLD AUTO: 8.2 X10E3/UL (ref 3.4–10.8)

## 2019-04-29 NOTE — PROGRESS NOTES
Subjective:       Patient ID: Cata Peoples is a 68 y.o. female with sero+ccp+RA and APS (warfarin); Also OA especially of the hands.     Chief Complaint: No chief complaint on file.    Returns for follow-up. Last seen on 8/9/18 for cost containment but labs should be every 3 months.    In March had R Payan's cyst & saw her Orthopedic Surgeon (Dr. Morfin--[facility]--who did her TKAs) who prescribed a medrol dose magali to which she responded and then rxd w prednisone 10 mg/d x 1 month & now is on 5 mg/d. She is not having any joint problems now except for low back pain with standing/walking. She is still on MTX 20 mg/wk and has no problems with it. She has no RA/peripheral joint complaints. Crocheting every night.  AM stiffness is 30 minutes mostly in low back.. Denies Raynaud's, dysphagia, tight skin, alopecia, oral ulcers, dry mouth, pleurisy, pericarditis, photosensitivity, skin rashes, new thromboses (had DVT behind right knee in May 2015 while on xarelto; is on chronic warfarin). Has mild dry eyes.          Current Outpatient Medications   Medication Sig Dispense Refill    acetaminophen (TYLENOL) 500 MG tablet Take 500 mg by mouth 2 (two) times daily.      biotin 1 mg Cap Take by mouth.      FLUZONE HIGH-DOSE 2016-17, PF, 180 mcg/0.5 mL Syrg TO BE ADMINISTERED BY PHARMACIST FOR IMMUNIZATION  0    folic acid (FOLVITE) 1 MG tablet TAKE 1 TABLET BY MOUTH EVERY DAY 90 tablet 0    latanoprost 0.005 % ophthalmic solution Place 1 drop into both eyes nightly.  12    methotrexate 2.5 MG Tab TAKE 8 TABLETS (20 MG TOTAL) BY MOUTH EVERY 7 DAYS. 120 tablet 1    metoprolol tartrate (LOPRESSOR) 50 MG tablet Take 50 mg by mouth 2 (two) times daily.  5    multivitamin (ONE DAILY MULTIVITAMIN) per tablet Take 1 tablet by mouth once daily.      omeprazole (PRILOSEC) 40 MG capsule TAKE 1 CAPSULE(40 MG) BY MOUTH EVERY DAY 30 capsule 0    predniSONE (DELTASONE) 5 MG tablet Take 1 tablet (5 mg total) by  mouth once daily. 90 tablet 1    propafenone (RHTHYMOL) 150 MG Tab 150 mg every 8 (eight) hours.       traMADol (ULTRAM) 50 mg tablet Use sparingly: 1-2 tablets 4 x/day; combine with acetaminophen. 240 tablet 3    vitamin D 1000 units Tab Take 185 mg by mouth once daily.      warfarin (COUMADIN) 4 MG tablet   3     No current facility-administered medications for this visit.        Only taking prednisone 2.5 mg/day.      Review of patient's allergies indicates:   Allergen Reactions    Ambien [zolpidem]      Hallucinations     Past Medical History:   Diagnosis Date    *Atrial fibrillation     Acid reflux     Allergy     Anxiety     Arthritis     Deep vein thrombosis     Depression     Pulmonary embolism      Past Surgical History:   Procedure Laterality Date    JOINT REPLACEMENT      knee x4 replacements-2 R, and 2 L knee         Review of Systems   Constitutional: Negative.  Negative for diaphoresis, fatigue, fever and unexpected weight change.   HENT: Positive for tinnitus. Negative for dental problem, mouth sores, nosebleeds and sinus pressure.    Eyes: Negative.  Negative for pain, itching and visual disturbance.        Dry eyes   Respiratory: Negative.  Negative for cough, chest tightness and wheezing.    Cardiovascular: Negative.  Negative for chest pain and leg swelling.   Gastrointestinal: Negative.  Negative for blood in stool, constipation, diarrhea and vomiting.   Endocrine: Negative.    Genitourinary: Positive for enuresis. Negative for dysuria, hematuria and urgency.        Overactive bladder; recurrent asymptomatic UTIs.   Musculoskeletal: Positive for back pain and gait problem (uses cane). Negative for arthralgias, joint swelling, myalgias, neck pain and neck stiffness.   Skin: Negative.  Negative for pallor and rash.        Ecchymoses  Dry skin   Allergic/Immunologic: Negative.    Neurological: Negative for dizziness, weakness, light-headedness and headaches.   Hematological:  Bruises/bleeds easily.   Psychiatric/Behavioral: Negative.  Negative for dysphoric mood and sleep disturbance (sleeps very well). The patient is not nervous/anxious.        Family History   Problem Relation Age of Onset    Cancer Mother         lung    Heart disease Father     Kidney disease Father      Social History     Tobacco Use    Smoking status: Never Smoker    Smokeless tobacco: Never Used   Substance Use Topics    Alcohol use: No    Drug use: No         Objective:   BP (!) 145/85   Pulse 62   Wt 103.9 kg (229 lb)   BMI 33.82 kg/m²     Physical Exam   Constitutional: She is oriented to person, place, and time and well-developed, well-nourished, and in no distress. No distress.   HENT:   Head: Normocephalic and atraumatic.   Mouth/Throat: Oropharynx is clear and moist. No oropharyngeal exudate.        Eyes: Conjunctivae and EOM are normal. Pupils are equal, round, and reactive to light. No scleral icterus.   Neck: Neck supple. No JVD present. No tracheal deviation present. No thyromegaly present.   Cardiovascular: Normal rate, regular rhythm, normal heart sounds and intact distal pulses.  Exam reveals no gallop and no friction rub.    No murmur heard.  Pulmonary/Chest: Effort normal and breath sounds normal. No respiratory distress. She has no wheezes. She has no rales. She exhibits no tenderness.   Abdominal: Soft. She exhibits no distension and no mass. There is no splenomegaly or hepatomegaly. There is no tenderness. There is no rebound and no guarding.   Lymphadenopathy:     She has no cervical adenopathy.        Right: No inguinal adenopathy present.        Left: No inguinal adenopathy present.   Neurological: She is alert and oriented to person, place, and time. She has normal reflexes. She displays normal reflexes. No cranial nerve deficit. She exhibits normal muscle tone.   Cannot get up from a chair without using arms.  Feels in part this is due to low back issues.    Skin: Skin is warm and  dry. She is not diaphoretic.     Psychiatric: Mood, memory, affect and judgment normal.   Musculoskeletal: She exhibits edema (1+ pedal & pretibial) and deformity (Rheumatic changes). She exhibits no tenderness.   No synovitis anywhere.  Bilateral crepitus TMJ. Mild decrease in   rotation of the cervical spine. Shoulders are unremarkable. Elbows with mild R flexion contraction; minimal decrease in extension on L; Left wrist & 2,3 & 4th joints MCPs minimal SHT; PIPs with prominent Fam nodes and zigzagging. DIPs with prominent Heberden nodes and zigzagging.  Right hip with scar and with limitation of internal, external, rotation, flexion. Left also with some limitation to lesser degree.Knees with bilateral scars and bony hypertrophy and   crepitus with no synovitis. No popliteal fullness. Ankles are with limited motion. No tenderness. Heels and toes are without metatarsalgia. Lumbar spine no midline tenderness.             R hand      L hand          LABS:   4/22/19 ([facility]): ESR 14; CRP 5.3(4.9); CBC ok; CMP ok;  11/19/18: ([facility]): ESR 11; CRP 4.6 (4.9); CBC ok; CMP glu 100  8/1/18 ([facility]): ESR 8; CRP 4.9 (4.9); CBC ok; hi indices; CMP ok  1/2/18: ESR 14;CRP 3.3; CBC ok; CMP ok  8/16/17: ESR 36; CRP 46.3; CBC ok; CMP  5/19/17: Vit D 48;   2/13/17: ESR 25; CRP 36.8; CBC eos 10.1%; CMP ok;   11/1/16: ESR 12; CRP 4.4; CBC ok; CMP ok;   7/26/16: CPR 11.9; CBC ok; CMP ok;  1/6/16: ESR 23; CRP 5.7; Hg 11.6; CMP ok;   3/17/15: ESR 25; CRP 14.7; Hg 11; Ht 34.7; Alb. 3.3  12/17/14: ESR18;CRP 4; CBC ok; CMP ok; Vit D 23  9/17/14: ESR 15; CRP 4.9; CBC ok; CMP ok;   6/18/14: ESR 22; CRP 3.7; Vit D 26; +LAC; neg aCLA & beta 2 glyco;   12/9/13: ESR 30; CRP 20.5; CBC, CMP ok; Vit D 31;   9/26/23: +LAC  9/23/13: ESR 10; CRP 4.4; CBC, CMP ok;  6/20/13: ESR 30; CRP 18.4; CBC, CMP ok;   LAC neg; HEX +; aCLA & beta 2 neg;     DXA: 1/15/18 ([facility]): T spine 3 views: compression fx w anterior wedging of the T11 vertebral body;  atherosclerotic disease; rotatory scoliosis & spondylosis of the osteopenic/osteoporotic thoracic & upper lumbar spine.    DXA: 1/20/15: TLS 1.1; TFN -0.6; TTH -0.7    6/18/14: Arthritis Survey personally reviewed: OA & RA with Bilateral TKRs with ? loosening L TKR;   MRI from 12/12: Extensive pannus in the right hip joint, suggesting rheumatoid arthritis. There is prominent associated marrow edema within the femoral head and a portion of the acetabulum. Small focus of decreased T1/T2 signal in the right femoral head, suggesting avascular necrosis (less than 25% involvement of the femoral head). Mild pannus in the left hip joint. No MR findings of osteonecrosis in the left femoral head.    Assessment:   Seropositive and CCP positive rheumatoid arthritis involving hands--very stable,    feet, elbows, and wrists,    well controlled on prednisone 5 mg/d, MTX 15/wk and folic acid 1 mg/d.   Instructed pt to try and cut down prednisone alternating 5mg and 2.5mg   Increased MTX to 20/wk for better control of RA   Off Enbrel since 12/14.    Had multiple UTIs on it & cellulitis of one of her legs.    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     No hx of miscarriages or other thrombotic event   +LAC (neg aCLA & beta 2 glyc);      On coumadin - INR checks [id number]; off xarelto due to cost.    Pedal edema   Due to venous insufficiency & weight.     Wedge compression of T 11 by hx by imaging 11/20/17   CD could not open    Vitamin D insufficiency--recurrent;    normal DXA in past    Repeat DXA to be done by PCP (not time yet)    Generalized osteoarthritis:   Low back pain secondary to degenerative disk disease especially involving L2-L3, but also L3-L4 with scoliosis and retrolisthesis.     Also with sacroiliac area tender  points.   Status post bilateral knee replacements with repeat revision on the right January 2010 requiring blood transfusion.    S/P R THR 3/18/14.   Severe degenerative joint disease of the hands.     Fully functional    Depressive disorder--stab;e    Hypertension.     GERD     Obesity       Plan:   Discussed escalation of rx. Patient does not want SC MTX or any other meds at this time.  Increase MTX to 25 mg/wk po & folic acid 1 mg/day.  Continue prednisone 5 mg/d for time being.  Labs in 5 weeks & 3 months later & q 3 months.   Discussed OP again. She states her PCP will handle DXA.  Ok to see her every 6 months for cost containment.  Weight loss.  She will contact us prn problems  RTC 6 months or prn.

## 2019-04-30 ENCOUNTER — OFFICE VISIT (OUTPATIENT)
Dept: RHEUMATOLOGY | Facility: CLINIC | Age: 69
End: 2019-04-30
Payer: MEDICARE

## 2019-04-30 VITALS
WEIGHT: 229 LBS | BODY MASS INDEX: 33.82 KG/M2 | SYSTOLIC BLOOD PRESSURE: 145 MMHG | DIASTOLIC BLOOD PRESSURE: 85 MMHG | HEART RATE: 62 BPM

## 2019-04-30 DIAGNOSIS — Z79.52 LONG TERM CURRENT USE OF SYSTEMIC STEROIDS: ICD-10-CM

## 2019-04-30 DIAGNOSIS — M15.9 GENERALIZED OSTEOARTHRITIS OF MULTIPLE SITES: ICD-10-CM

## 2019-04-30 DIAGNOSIS — Z79.631 LONG TERM METHOTREXATE USER: ICD-10-CM

## 2019-04-30 DIAGNOSIS — M05.79 RHEUMATOID ARTHRITIS INVOLVING MULTIPLE SITES WITH POSITIVE RHEUMATOID FACTOR: Primary | ICD-10-CM

## 2019-04-30 DIAGNOSIS — D68.61 ANTI-PHOSPHOLIPID SYNDROME: ICD-10-CM

## 2019-04-30 DIAGNOSIS — E66.9 OBESITY (BMI 30.0-34.9): ICD-10-CM

## 2019-04-30 DIAGNOSIS — S22.000A COMPRESSION FRACTURE OF BODY OF THORACIC VERTEBRA: ICD-10-CM

## 2019-04-30 PROCEDURE — 99999 PR PBB SHADOW E&M-EST. PATIENT-LVL III: CPT | Mod: PBBFAC,,, | Performed by: INTERNAL MEDICINE

## 2019-04-30 PROCEDURE — 99214 PR OFFICE/OUTPT VISIT, EST, LEVL IV, 30-39 MIN: ICD-10-PCS | Mod: S$GLB,,, | Performed by: INTERNAL MEDICINE

## 2019-04-30 PROCEDURE — 3077F PR MOST RECENT SYSTOLIC BLOOD PRESSURE >= 140 MM HG: ICD-10-PCS | Mod: CPTII,S$GLB,, | Performed by: INTERNAL MEDICINE

## 2019-04-30 PROCEDURE — 99214 OFFICE O/P EST MOD 30 MIN: CPT | Mod: S$GLB,,, | Performed by: INTERNAL MEDICINE

## 2019-04-30 PROCEDURE — 99999 PR PBB SHADOW E&M-EST. PATIENT-LVL III: ICD-10-PCS | Mod: PBBFAC,,, | Performed by: INTERNAL MEDICINE

## 2019-04-30 PROCEDURE — 3079F DIAST BP 80-89 MM HG: CPT | Mod: CPTII,S$GLB,, | Performed by: INTERNAL MEDICINE

## 2019-04-30 PROCEDURE — 3079F PR MOST RECENT DIASTOLIC BLOOD PRESSURE 80-89 MM HG: ICD-10-PCS | Mod: CPTII,S$GLB,, | Performed by: INTERNAL MEDICINE

## 2019-04-30 PROCEDURE — 3077F SYST BP >= 140 MM HG: CPT | Mod: CPTII,S$GLB,, | Performed by: INTERNAL MEDICINE

## 2019-04-30 RX ORDER — METHOTREXATE 2.5 MG/1
TABLET ORAL
Qty: 150 TABLET | Refills: 1 | Status: SHIPPED | OUTPATIENT
Start: 2019-04-30 | End: 2020-01-10 | Stop reason: SDUPTHER

## 2019-04-30 ASSESSMENT — ROUTINE ASSESSMENT OF PATIENT INDEX DATA (RAPID3)
TOTAL RAPID3 SCORE: 4.5
PAIN SCORE: 6
WHEN YOU AWAKENED IN THE MORNING OVER THE LAST WEEK, PLEASE INDICATE THE AMOUNT OF TIME IT TAKES UNTIL YOU ARE AS LIMBER AS YOU WILL BE FOR THE DAY: 30MIN
FATIGUE SCORE: 2.5
PSYCHOLOGICAL DISTRESS SCORE: 0
MDHAQ FUNCTION SCORE: .9
AM STIFFNESS SCORE: 1, YES
PATIENT GLOBAL ASSESSMENT SCORE: 4.5

## 2019-04-30 NOTE — PATIENT INSTRUCTIONS
Labs in 5 weeks & every 3 months thereafter.    Let us know if you should develop more swelling in the back of your knees.    Do not gain any more weight.

## 2019-05-11 DIAGNOSIS — K21.9 GASTROESOPHAGEAL REFLUX DISEASE WITHOUT ESOPHAGITIS: ICD-10-CM

## 2019-05-12 RX ORDER — OMEPRAZOLE 40 MG/1
CAPSULE, DELAYED RELEASE ORAL
Qty: 30 CAPSULE | Refills: 0 | Status: SHIPPED | OUTPATIENT
Start: 2019-05-12 | End: 2019-06-10 | Stop reason: SDUPTHER

## 2019-06-08 LAB
ALBUMIN SERPL-MCNC: 4.6 G/DL (ref 3.5–4.8)
ALBUMIN/GLOB SERPL: 2.1 {RATIO} (ref 1.2–2.2)
ALP SERPL-CCNC: 80 IU/L (ref 39–117)
ALT SERPL-CCNC: 10 IU/L (ref 0–32)
AST SERPL-CCNC: 18 IU/L (ref 0–40)
BASOPHILS # BLD AUTO: 0 X10E3/UL (ref 0–0.2)
BASOPHILS NFR BLD AUTO: 0 %
BILIRUB SERPL-MCNC: 0.4 MG/DL (ref 0–1.2)
BUN SERPL-MCNC: 22 MG/DL (ref 8–27)
BUN/CREAT SERPL: 22 (ref 12–28)
CALCIUM SERPL-MCNC: 10.1 MG/DL (ref 8.7–10.3)
CHLORIDE SERPL-SCNC: 103 MMOL/L (ref 96–106)
CO2 SERPL-SCNC: 23 MMOL/L (ref 20–29)
CREAT SERPL-MCNC: 1.01 MG/DL (ref 0.57–1)
CRP SERPL-MCNC: 4 MG/L (ref 0–4.9)
EOSINOPHIL # BLD AUTO: 0.2 X10E3/UL (ref 0–0.4)
EOSINOPHIL NFR BLD AUTO: 3 %
ERYTHROCYTE [DISTWIDTH] IN BLOOD BY AUTOMATED COUNT: 17 % (ref 12.3–15.4)
ERYTHROCYTE [SEDIMENTATION RATE] IN BLOOD BY WESTERGREN METHOD: 6 MM/HR (ref 0–40)
GLOBULIN SER CALC-MCNC: 2.2 G/DL (ref 1.5–4.5)
GLUCOSE SERPL-MCNC: 93 MG/DL (ref 65–99)
HCT VFR BLD AUTO: 40.5 % (ref 34–46.6)
HGB BLD-MCNC: 12.8 G/DL (ref 11.1–15.9)
IMM GRANULOCYTES # BLD AUTO: 0 X10E3/UL (ref 0–0.1)
IMM GRANULOCYTES NFR BLD AUTO: 0 %
LYMPHOCYTES # BLD AUTO: 0.7 X10E3/UL (ref 0.7–3.1)
LYMPHOCYTES NFR BLD AUTO: 10 %
MCH RBC QN AUTO: 30.9 PG (ref 26.6–33)
MCHC RBC AUTO-ENTMCNC: 31.6 G/DL (ref 31.5–35.7)
MCV RBC AUTO: 98 FL (ref 79–97)
MONOCYTES # BLD AUTO: 0.8 X10E3/UL (ref 0.1–0.9)
MONOCYTES NFR BLD AUTO: 12 %
NEUTROPHILS # BLD AUTO: 5.5 X10E3/UL (ref 1.4–7)
NEUTROPHILS NFR BLD AUTO: 75 %
PLATELET # BLD AUTO: 247 X10E3/UL (ref 150–450)
POTASSIUM SERPL-SCNC: 5.1 MMOL/L (ref 3.5–5.2)
PROT SERPL-MCNC: 6.8 G/DL (ref 6–8.5)
RBC # BLD AUTO: 4.14 X10E6/UL (ref 3.77–5.28)
SODIUM SERPL-SCNC: 143 MMOL/L (ref 134–144)
WBC # BLD AUTO: 7.3 X10E3/UL (ref 3.4–10.8)

## 2019-06-10 DIAGNOSIS — M05.79 RHEUMATOID ARTHRITIS INVOLVING MULTIPLE SITES WITH POSITIVE RHEUMATOID FACTOR: ICD-10-CM

## 2019-06-10 DIAGNOSIS — K21.9 GASTROESOPHAGEAL REFLUX DISEASE WITHOUT ESOPHAGITIS: ICD-10-CM

## 2019-06-10 RX ORDER — OMEPRAZOLE 40 MG/1
CAPSULE, DELAYED RELEASE ORAL
Qty: 30 CAPSULE | Refills: 0 | Status: SHIPPED | OUTPATIENT
Start: 2019-06-10 | End: 2019-07-14 | Stop reason: SDUPTHER

## 2019-06-10 RX ORDER — FOLIC ACID 1 MG/1
TABLET ORAL
Qty: 90 TABLET | Refills: 0 | Status: SHIPPED | OUTPATIENT
Start: 2019-06-10 | End: 2019-09-02 | Stop reason: SDUPTHER

## 2019-06-10 NOTE — TELEPHONE ENCOUNTER
----- Message from Rocio Argueta MD sent at 6/8/2019  2:59 PM CDT -----  Your kidney function has dropped off a bit. Make sure you keep up with your fluid intake and don't take any NSAIDs (no ibuprofen, naproxen, etc).  I will try to send your labs to your PCP.

## 2019-06-10 NOTE — TELEPHONE ENCOUNTER
Called to inform patient of lab results, and told patient to keep up with her fluid intake, not to take any NSAIDS and naproxen.  Patient verbalizes understanding.

## 2019-07-14 DIAGNOSIS — K21.9 GASTROESOPHAGEAL REFLUX DISEASE WITHOUT ESOPHAGITIS: ICD-10-CM

## 2019-07-14 DIAGNOSIS — M05.79 RHEUMATOID ARTHRITIS INVOLVING MULTIPLE SITES WITH POSITIVE RHEUMATOID FACTOR: ICD-10-CM

## 2019-07-15 RX ORDER — OMEPRAZOLE 40 MG/1
CAPSULE, DELAYED RELEASE ORAL
Qty: 30 CAPSULE | Refills: 0 | Status: SHIPPED | OUTPATIENT
Start: 2019-07-15 | End: 2019-08-10 | Stop reason: SDUPTHER

## 2019-07-15 RX ORDER — PREDNISONE 5 MG/1
TABLET ORAL
Qty: 90 TABLET | Refills: 0 | Status: SHIPPED | OUTPATIENT
Start: 2019-07-15 | End: 2020-03-29

## 2019-08-10 DIAGNOSIS — K21.9 GASTROESOPHAGEAL REFLUX DISEASE WITHOUT ESOPHAGITIS: ICD-10-CM

## 2019-08-11 RX ORDER — OMEPRAZOLE 40 MG/1
CAPSULE, DELAYED RELEASE ORAL
Qty: 30 CAPSULE | Refills: 0 | Status: SHIPPED | OUTPATIENT
Start: 2019-08-11 | End: 2019-09-09 | Stop reason: SDUPTHER

## 2019-09-02 DIAGNOSIS — M05.79 RHEUMATOID ARTHRITIS INVOLVING MULTIPLE SITES WITH POSITIVE RHEUMATOID FACTOR: ICD-10-CM

## 2019-09-03 RX ORDER — FOLIC ACID 1 MG/1
TABLET ORAL
Qty: 90 TABLET | Refills: 0 | Status: SHIPPED | OUTPATIENT
Start: 2019-09-03 | End: 2019-12-06 | Stop reason: SDUPTHER

## 2019-09-09 DIAGNOSIS — K21.9 GASTROESOPHAGEAL REFLUX DISEASE WITHOUT ESOPHAGITIS: ICD-10-CM

## 2019-09-10 DIAGNOSIS — K21.9 GASTROESOPHAGEAL REFLUX DISEASE WITHOUT ESOPHAGITIS: ICD-10-CM

## 2019-09-10 LAB
ALBUMIN SERPL-MCNC: 4.8 G/DL (ref 3.5–4.8)
ALBUMIN/GLOB SERPL: 2.2 {RATIO} (ref 1.2–2.2)
ALP SERPL-CCNC: 83 IU/L (ref 39–117)
ALT SERPL-CCNC: 13 IU/L (ref 0–32)
AST SERPL-CCNC: 21 IU/L (ref 0–40)
BASOPHILS # BLD AUTO: 0 X10E3/UL (ref 0–0.2)
BASOPHILS NFR BLD AUTO: 0 %
BILIRUB SERPL-MCNC: 0.4 MG/DL (ref 0–1.2)
BUN SERPL-MCNC: 18 MG/DL (ref 8–27)
BUN/CREAT SERPL: 23 (ref 12–28)
CALCIUM SERPL-MCNC: 10.5 MG/DL (ref 8.7–10.3)
CHLORIDE SERPL-SCNC: 101 MMOL/L (ref 96–106)
CO2 SERPL-SCNC: 25 MMOL/L (ref 20–29)
CREAT SERPL-MCNC: 0.77 MG/DL (ref 0.57–1)
CRP SERPL-MCNC: 8 MG/L (ref 0–10)
EOSINOPHIL # BLD AUTO: 0.1 X10E3/UL (ref 0–0.4)
EOSINOPHIL NFR BLD AUTO: 2 %
ERYTHROCYTE [DISTWIDTH] IN BLOOD BY AUTOMATED COUNT: 17.2 % (ref 12.3–15.4)
ERYTHROCYTE [SEDIMENTATION RATE] IN BLOOD BY WESTERGREN METHOD: 12 MM/HR (ref 0–40)
GLOBULIN SER CALC-MCNC: 2.2 G/DL (ref 1.5–4.5)
GLUCOSE SERPL-MCNC: 97 MG/DL (ref 65–99)
HCT VFR BLD AUTO: 40 % (ref 34–46.6)
HGB BLD-MCNC: 12.9 G/DL (ref 11.1–15.9)
IMM GRANULOCYTES # BLD AUTO: 0 X10E3/UL (ref 0–0.1)
IMM GRANULOCYTES NFR BLD AUTO: 0 %
LYMPHOCYTES # BLD AUTO: 0.8 X10E3/UL (ref 0.7–3.1)
LYMPHOCYTES NFR BLD AUTO: 12 %
MCH RBC QN AUTO: 31.6 PG (ref 26.6–33)
MCHC RBC AUTO-ENTMCNC: 32.3 G/DL (ref 31.5–35.7)
MCV RBC AUTO: 98 FL (ref 79–97)
MONOCYTES # BLD AUTO: 0.5 X10E3/UL (ref 0.1–0.9)
MONOCYTES NFR BLD AUTO: 7 %
NEUTROPHILS # BLD AUTO: 5.3 X10E3/UL (ref 1.4–7)
NEUTROPHILS NFR BLD AUTO: 79 %
PLATELET # BLD AUTO: 276 X10E3/UL (ref 150–450)
POTASSIUM SERPL-SCNC: 5.1 MMOL/L (ref 3.5–5.2)
PROT SERPL-MCNC: 7 G/DL (ref 6–8.5)
RBC # BLD AUTO: 4.08 X10E6/UL (ref 3.77–5.28)
SODIUM SERPL-SCNC: 142 MMOL/L (ref 134–144)
WBC # BLD AUTO: 6.7 X10E3/UL (ref 3.4–10.8)

## 2019-09-10 RX ORDER — OMEPRAZOLE 40 MG/1
CAPSULE, DELAYED RELEASE ORAL
Qty: 90 CAPSULE | Refills: 0 | Status: SHIPPED | OUTPATIENT
Start: 2019-09-10 | End: 2020-02-05 | Stop reason: SDUPTHER

## 2019-09-10 RX ORDER — OMEPRAZOLE 40 MG/1
CAPSULE, DELAYED RELEASE ORAL
Qty: 30 CAPSULE | Refills: 0 | Status: SHIPPED | OUTPATIENT
Start: 2019-09-10 | End: 2019-09-10 | Stop reason: SDUPTHER

## 2019-10-07 DIAGNOSIS — K21.9 GASTROESOPHAGEAL REFLUX DISEASE WITHOUT ESOPHAGITIS: ICD-10-CM

## 2019-10-07 DIAGNOSIS — M05.79 RHEUMATOID ARTHRITIS INVOLVING MULTIPLE SITES WITH POSITIVE RHEUMATOID FACTOR: ICD-10-CM

## 2019-10-08 RX ORDER — OMEPRAZOLE 40 MG/1
CAPSULE, DELAYED RELEASE ORAL
Qty: 30 CAPSULE | Refills: 0 | Status: SHIPPED | OUTPATIENT
Start: 2019-10-08 | End: 2020-02-05 | Stop reason: SDUPTHER

## 2019-10-08 RX ORDER — PREDNISONE 5 MG/1
TABLET ORAL
Qty: 90 TABLET | Refills: 0 | Status: SHIPPED | OUTPATIENT
Start: 2019-10-08 | End: 2020-12-02 | Stop reason: ALTCHOICE

## 2019-10-24 NOTE — PROGRESS NOTES
Subjective:       Patient ID: Cata Peoples is a 69 y.o. female with sero+ccp+RA and APS (warfarin); Also OA especially of the hands.     Chief Complaint: No chief complaint on file.    Returns for follow-up. Last seen on 4/30/19 but had labs 1 month ago. She is on MTX 25 mg/wk + folic acid 1 mg/d (dose raised at last visit) and doing very well RA wise. Even OA wise doing well. Her main problem is her chronic low back pain for which she is followed outside of [facility] & for which she is taking prednisone 5 mg/d.  She has no problems with MTX. Denies joint pain/swelling. Still crocheting. Trying to be active. Takes 12 yr old and 9 yr old grandsons to school every AM.  Has to take 2 tramadol to do so, but does not take any more during the day. AM stiffness is only in low back.. Denies Raynaud's, dysphagia, tight skin, alopecia, oral ulcers, dry mouth, pleurisy, pericarditis, photosensitivity, skin rashes, new thromboses (had DVT behind right knee in May 2015 while on xarelto; is on chronic warfarin). Has mild dry eyes.       Current Outpatient Medications   Medication Sig Dispense Refill    acetaminophen (TYLENOL) 500 MG tablet Take 500 mg by mouth 2 (two) times daily.      biotin 1 mg Cap Take by mouth.      FLUZONE HIGH-DOSE 2016-17, PF, 180 mcg/0.5 mL Syrg TO BE ADMINISTERED BY PHARMACIST FOR IMMUNIZATION  0    folic acid (FOLVITE) 1 MG tablet TAKE 1 TABLET BY MOUTH EVERY DAY 90 tablet 0    latanoprost 0.005 % ophthalmic solution Place 1 drop into both eyes nightly.  12    methotrexate 2.5 MG Tab TAKE 10 TABLETS (20 MG TOTAL) BY MOUTH EVERY 7 DAYS. Requires labs every 3 months ago. 150 tablet 1    metoprolol tartrate (LOPRESSOR) 50 MG tablet Take 50 mg by mouth 2 (two) times daily.  5    multivitamin (ONE DAILY MULTIVITAMIN) per tablet Take 1 tablet by mouth once daily.      omeprazole (PRILOSEC) 40 MG capsule TAKE 1 CAPSULE(40 MG) BY MOUTH EVERY DAY 90 capsule 0    omeprazole (PRILOSEC) 40 MG capsule  TAKE 1 CAPSULE(40 MG) BY MOUTH EVERY DAY 30 capsule 0    predniSONE (DELTASONE) 5 MG tablet TAKE 1 TABLET(5 MG) BY MOUTH EVERY DAY 90 tablet 0    predniSONE (DELTASONE) 5 MG tablet TAKE 1 TABLET(5 MG) BY MOUTH EVERY DAY 90 tablet 0    propafenone (RHTHYMOL) 150 MG Tab 150 mg every 8 (eight) hours.       traMADol (ULTRAM) 50 mg tablet Use sparingly: 1-2 tablets 4 x/day; combine with acetaminophen. 240 tablet 3    vitamin D 1000 units Tab Take 185 mg by mouth once daily.      warfarin (COUMADIN) 4 MG tablet   3     No current facility-administered medications for this visit.          Review of patient's allergies indicates:   Allergen Reactions    Ambien [zolpidem]      Hallucinations     Past Medical History:   Diagnosis Date    *Atrial fibrillation     Acid reflux     Allergy     Anxiety     Arthritis     Deep vein thrombosis     Depression     Pulmonary embolism      Past Surgical History:   Procedure Laterality Date    JOINT REPLACEMENT      knee x4 replacements-2 R, and 2 L knee         Review of Systems   Constitutional: Negative.  Negative for diaphoresis, fatigue, fever and unexpected weight change.   HENT: Positive for tinnitus. Negative for dental problem, mouth sores, nosebleeds and sinus pressure.    Eyes: Negative.  Negative for pain, itching and visual disturbance.        Dry eyes   Respiratory: Negative.  Negative for cough, chest tightness and wheezing.    Cardiovascular: Negative.  Negative for chest pain and leg swelling.   Gastrointestinal: Negative.  Negative for blood in stool, constipation, diarrhea and vomiting.   Endocrine: Negative.    Genitourinary: Positive for enuresis. Negative for dysuria, hematuria and urgency.        Overactive bladder; recurrent asymptomatic UTIs.   Musculoskeletal: Positive for back pain and gait problem (uses cane). Negative for arthralgias, joint swelling, myalgias, neck pain and neck stiffness.   Skin: Negative.  Negative for pallor and rash.         "Ecchymoses  Dry skin   Allergic/Immunologic: Negative.    Neurological: Negative for dizziness, weakness, light-headedness and headaches.   Hematological: Bruises/bleeds easily.   Psychiatric/Behavioral: Negative.  Negative for dysphoric mood and sleep disturbance (sleeps very well). The patient is not nervous/anxious.        Family History   Problem Relation Age of Onset    Cancer Mother         lung    Heart disease Father     Kidney disease Father      Has 7 grandchildren: takes 9 & 12 yr old to school every AM.  Social History     Tobacco Use    Smoking status: Never Smoker    Smokeless tobacco: Never Used   Substance Use Topics    Alcohol use: No    Drug use: No         Objective:   /75   Pulse 64   Ht 5' 9" (1.753 m)   Wt 103.6 kg (228 lb 6.3 oz)   BMI 33.73 kg/m²   Was 229 lb on 4/30/19  Physical Exam   Constitutional: She is oriented to person, place, and time and well-developed, well-nourished, and in no distress. No distress.   HENT:   Head: Normocephalic and atraumatic.   Mouth/Throat: Oropharynx is clear and moist. No oropharyngeal exudate.        Eyes: Conjunctivae and EOM are normal. Pupils are equal, round, and reactive to light. No scleral icterus.   Neck: Neck supple. No JVD present. No tracheal deviation present. No thyromegaly present.   Cardiovascular: Normal rate, regular rhythm, normal heart sounds and intact distal pulses.  Exam reveals no gallop and no friction rub.    No murmur heard.  Pulmonary/Chest: Effort normal and breath sounds normal. No respiratory distress. She has no wheezes. She has no rales. She exhibits no tenderness.   Abdominal: Soft. She exhibits no distension and no mass. There is no splenomegaly or hepatomegaly. There is no tenderness. There is no rebound and no guarding.   Lymphadenopathy:     She has no cervical adenopathy.   Neurological: She is alert and oriented to person, place, and time. She has normal reflexes. She displays normal reflexes. No " cranial nerve deficit. She exhibits normal muscle tone.   Cannot get up from a chair without using arms.  Feels in part this is due to low back issues.    Skin: Skin is warm and dry. She is not diaphoretic.     Psychiatric: Mood, memory, affect and judgment normal.   Musculoskeletal: She exhibits edema (1+ pedal & pretibial) and deformity (Rheumatic changes). She exhibits no tenderness.   No synovitis anywhere.  Bilateral crepitus TMJ. Mild decrease in   rotation of the cervical spine. Shoulders are unremarkable. Elbows with mild R flexion contraction; minimal decrease in extension on L; Left wrist & 2,3 & 4th joints MCPs minimal SHT; PIPs with prominent Fam nodes and zigzagging. DIPs with prominent Heberden nodes and zigzagging.  Right hip with scar and with limitation of internal, external, rotation, flexion. Left also with some limitation to lesser degree.Knees with bilateral scars and bony hypertrophy and   crepitus with no synovitis. Ankles are with limited motion. No tenderness. Heels and toes are without metatarsalgia. Lumbar spine no midline tenderness.                 LABS:   9/19/19: ESR 12; CRP 8 (10); CBC ok; CMP Ca 10.5;   4/22/19 ([facility]): ESR 14; CRP 5.3(4.9); CBC ok; CMP ok;  11/19/18: ([facility]): ESR 11; CRP 4.6 (4.9); CBC ok; CMP glu 100  8/1/18 ([facility]): ESR 8; CRP 4.9 (4.9); CBC ok; hi indices; CMP ok  1/2/18: ESR 14;CRP 3.3; CBC ok; CMP ok  8/16/17: ESR 36; CRP 46.3; CBC ok; CMP  5/19/17: Vit D 48;   2/13/17: ESR 25; CRP 36.8; CBC eos 10.1%; CMP ok;   11/1/16: ESR 12; CRP 4.4; CBC ok; CMP ok;   7/26/16: CPR 11.9; CBC ok; CMP ok;  1/6/16: ESR 23; CRP 5.7; Hg 11.6; CMP ok;   3/17/15: ESR 25; CRP 14.7; Hg 11; Ht 34.7; Alb. 3.3  12/17/14: ESR18;CRP 4; CBC ok; CMP ok; Vit D 23  9/17/14: ESR 15; CRP 4.9; CBC ok; CMP ok;   6/18/14: ESR 22; CRP 3.7; Vit D 26; +LAC; neg aCLA & beta 2 glyco;   12/9/13: ESR 30; CRP 20.5; CBC, CMP ok; Vit D 31;   9/26/23: +LAC  9/23/13: ESR 10; CRP 4.4; CBC, CMP  ok;  6/20/13: ESR 30; CRP 18.4; CBC, CMP ok;   LAC neg; HEX +; aCLA & beta 2 neg;     DXA: 1/15/18 ([facility]): T spine 3 views: compression fx w anterior wedging of the T11 vertebral body; atherosclerotic disease; rotatory scoliosis & spondylosis of the osteopenic/osteoporotic thoracic & upper lumbar spine.    DXA: 1/20/15: TLS 1.1; TFN -0.6; TTH -0.7    6/18/14: Arthritis Survey personally reviewed: OA & RA with Bilateral TKRs with ? loosening L TKR;   MRI from 12/12: Extensive pannus in the right hip joint, suggesting rheumatoid arthritis. There is prominent associated marrow edema within the femoral head and a portion of the acetabulum. Small focus of decreased T1/T2 signal in the right femoral head, suggesting avascular necrosis (less than 25% involvement of the femoral head). Mild pannus in the left hip joint. No MR findings of osteonecrosis in the left femoral head.    Assessment:   Seropositive and CCP positive rheumatoid arthritis involving hands--very stable,    feet, elbows, and wrists,    well controlled on prednisone 5 mg/d, MTX 15/wk and folic acid 1 mg/d.   Instructed pt to try and cut down prednisone alternating 5mg and 2.5mg   Increased MTX to 20/wk for better control of RA   Off Enbrel since 12/14.    Had multiple UTIs on it & cellulitis of one of her legs.    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     No hx of miscarriages or other thrombotic event   +LAC (neg aCLA & beta 2 glyc);      On coumadin - INR checks [id number]; off xarelto due to cost.    Pedal edema   Due to venous insufficiency & weight.     Wedge compression of T 11 by hx by imaging 11/20/17   CD could not open    Vitamin D insufficiency--recurrent;    normal DXA in past    Repeat DXA wants us to order it now    Generalized osteoarthritis:   Low  back pain secondary to degenerative disk disease especially involving L2-L3, but also L3-L4 with scoliosis and retrolisthesis.     Also with sacroiliac area tender points.   Status post bilateral knee replacements with repeat revision on the right January 2010 requiring blood transfusion.    S/P R THR 3/18/14.   Severe degenerative joint disease of the hands.     Fully functional    Depressive disorder--stable    Hypertension.     GERD     Obesity       Plan:   Discussed whether to drop MTX further as she's doing well RA wise, but she is a bit reluctant so we will continue MTX to 25 mg/wk po & folic acid 1 mg/day.  Ok for prednisone 5 mg/d as per low back specialist as she can't function wo it.  Labs in December and q 3 months.  Discussed OP.  She will need rx beyond dietary calcium & vitamin D which we reviewed.   Ordered DXA for January at [facility]  Weight loss.  She will contact us prn problems  RTC 4 months or prn.    3/30/20    1/7/20: CBC ok;   12/19/19:CRP 2.4; CMP glu 105; Ca 10.4    3/10/20: [facility] DXA: TLS 0.3; TTH -2.2 (14.3% decrease); hi fx risk.     3/30/20: Overdue for labs; will add PTH, ionized calcium & vit D level.

## 2019-10-29 ENCOUNTER — OFFICE VISIT (OUTPATIENT)
Dept: RHEUMATOLOGY | Facility: CLINIC | Age: 69
End: 2019-10-29
Payer: MEDICARE

## 2019-10-29 VITALS
SYSTOLIC BLOOD PRESSURE: 108 MMHG | WEIGHT: 228.38 LBS | DIASTOLIC BLOOD PRESSURE: 75 MMHG | BODY MASS INDEX: 33.83 KG/M2 | HEIGHT: 69 IN | HEART RATE: 64 BPM

## 2019-10-29 DIAGNOSIS — D68.61 ANTI-PHOSPHOLIPID SYNDROME: ICD-10-CM

## 2019-10-29 DIAGNOSIS — M15.9 GENERALIZED OSTEOARTHRITIS OF MULTIPLE SITES: ICD-10-CM

## 2019-10-29 DIAGNOSIS — E83.52 HYPERCALCEMIA: ICD-10-CM

## 2019-10-29 DIAGNOSIS — M05.79 RHEUMATOID ARTHRITIS INVOLVING MULTIPLE SITES WITH POSITIVE RHEUMATOID FACTOR: Primary | ICD-10-CM

## 2019-10-29 DIAGNOSIS — S22.000A COMPRESSION FRACTURE OF BODY OF THORACIC VERTEBRA: ICD-10-CM

## 2019-10-29 DIAGNOSIS — Z79.52 LONG TERM CURRENT USE OF SYSTEMIC STEROIDS: ICD-10-CM

## 2019-10-29 DIAGNOSIS — Z79.631 LONG TERM METHOTREXATE USER: ICD-10-CM

## 2019-10-29 DIAGNOSIS — E66.9 OBESITY (BMI 30.0-34.9): ICD-10-CM

## 2019-10-29 PROCEDURE — 3074F PR MOST RECENT SYSTOLIC BLOOD PRESSURE < 130 MM HG: ICD-10-PCS | Mod: CPTII,S$GLB,, | Performed by: INTERNAL MEDICINE

## 2019-10-29 PROCEDURE — 99999 PR PBB SHADOW E&M-EST. PATIENT-LVL V: CPT | Mod: PBBFAC,,, | Performed by: INTERNAL MEDICINE

## 2019-10-29 PROCEDURE — 3078F PR MOST RECENT DIASTOLIC BLOOD PRESSURE < 80 MM HG: ICD-10-PCS | Mod: CPTII,S$GLB,, | Performed by: INTERNAL MEDICINE

## 2019-10-29 PROCEDURE — 3074F SYST BP LT 130 MM HG: CPT | Mod: CPTII,S$GLB,, | Performed by: INTERNAL MEDICINE

## 2019-10-29 PROCEDURE — 99214 PR OFFICE/OUTPT VISIT, EST, LEVL IV, 30-39 MIN: ICD-10-PCS | Mod: S$GLB,,, | Performed by: INTERNAL MEDICINE

## 2019-10-29 PROCEDURE — 99999 PR PBB SHADOW E&M-EST. PATIENT-LVL V: ICD-10-PCS | Mod: PBBFAC,,, | Performed by: INTERNAL MEDICINE

## 2019-10-29 PROCEDURE — 3078F DIAST BP <80 MM HG: CPT | Mod: CPTII,S$GLB,, | Performed by: INTERNAL MEDICINE

## 2019-10-29 PROCEDURE — 99214 OFFICE O/P EST MOD 30 MIN: CPT | Mod: S$GLB,,, | Performed by: INTERNAL MEDICINE

## 2019-10-29 ASSESSMENT — ROUTINE ASSESSMENT OF PATIENT INDEX DATA (RAPID3)
AM STIFFNESS SCORE: 1, YES
PSYCHOLOGICAL DISTRESS SCORE: 0
TOTAL RAPID3 SCORE: 6.5
PAIN SCORE: 8
PATIENT GLOBAL ASSESSMENT SCORE: 7.5
WHEN YOU AWAKENED IN THE MORNING OVER THE LAST WEEK, PLEASE INDICATE THE AMOUNT OF TIME IT TAKES UNTIL YOU ARE AS LIMBER AS YOU WILL BE FOR THE DAY: 1HR
FATIGUE SCORE: 7.5
MDHAQ FUNCTION SCORE: 1.2

## 2019-10-29 NOTE — PATIENT INSTRUCTIONS
Labs around 12/19/19 & 3/19/20 & 6/19/20.    Labs you need: ESR, CRP, CBC w diff & plts; CMP    DXA after 1/15/2020.    Lower abdominal strengthening exercises.

## 2019-10-29 NOTE — Clinical Note
Please order labs and find out if she's been taking OTC calcium and how much as her calcium level is high. If she has hold the PTH & ionized calcium. If she hasn't order them as well. She will need rx for osteoporosis in any event as DXA showed hi fracture risk (which we already knew).

## 2019-11-14 RX ORDER — TRAMADOL HYDROCHLORIDE 50 MG/1
TABLET ORAL
Qty: 120 TABLET | Refills: 0 | Status: SHIPPED | OUTPATIENT
Start: 2019-11-14 | End: 2019-12-30

## 2019-12-06 DIAGNOSIS — M05.79 RHEUMATOID ARTHRITIS INVOLVING MULTIPLE SITES WITH POSITIVE RHEUMATOID FACTOR: ICD-10-CM

## 2019-12-06 RX ORDER — FOLIC ACID 1 MG/1
1000 TABLET ORAL DAILY
Qty: 90 TABLET | Refills: 3 | Status: SHIPPED | OUTPATIENT
Start: 2019-12-06 | End: 2020-11-12 | Stop reason: SDUPTHER

## 2019-12-20 LAB
ALBUMIN SERPL-MCNC: 4.4 G/DL (ref 3.5–4.8)
ALBUMIN/GLOB SERPL: 1.8 {RATIO} (ref 1.2–2.2)
ALP SERPL-CCNC: 78 IU/L (ref 39–117)
ALT SERPL-CCNC: 12 IU/L (ref 0–32)
AST SERPL-CCNC: 19 IU/L (ref 0–40)
BILIRUB SERPL-MCNC: 0.4 MG/DL (ref 0–1.2)
BUN SERPL-MCNC: 15 MG/DL (ref 8–27)
BUN/CREAT SERPL: 19 (ref 12–28)
CALCIUM SERPL-MCNC: 10.4 MG/DL (ref 8.7–10.3)
CHLORIDE SERPL-SCNC: 104 MMOL/L (ref 96–106)
CO2 SERPL-SCNC: 23 MMOL/L (ref 20–29)
CREAT SERPL-MCNC: 0.81 MG/DL (ref 0.57–1)
CRP SERPL-MCNC: 5 MG/L (ref 0–10)
GLOBULIN SER CALC-MCNC: 2.4 G/DL (ref 1.5–4.5)
GLUCOSE SERPL-MCNC: 105 MG/DL (ref 65–99)
POTASSIUM SERPL-SCNC: 4.8 MMOL/L (ref 3.5–5.2)
PROT SERPL-MCNC: 6.8 G/DL (ref 6–8.5)
SODIUM SERPL-SCNC: 145 MMOL/L (ref 134–144)

## 2019-12-30 ENCOUNTER — TELEPHONE (OUTPATIENT)
Dept: RHEUMATOLOGY | Facility: CLINIC | Age: 69
End: 2019-12-30

## 2019-12-30 DIAGNOSIS — M05.79 RHEUMATOID ARTHRITIS INVOLVING MULTIPLE SITES WITH POSITIVE RHEUMATOID FACTOR: ICD-10-CM

## 2019-12-30 DIAGNOSIS — M15.9 GENERALIZED OSTEOARTHRITIS OF MULTIPLE SITES: Primary | ICD-10-CM

## 2019-12-30 RX ORDER — TRAMADOL HYDROCHLORIDE 50 MG/1
TABLET ORAL
Qty: 120 TABLET | Refills: 0 | OUTPATIENT
Start: 2019-12-30

## 2019-12-30 RX ORDER — METHOTREXATE 2.5 MG/1
TABLET ORAL
Qty: 150 TABLET | Refills: 1 | OUTPATIENT
Start: 2019-12-30

## 2019-12-30 RX ORDER — TRAMADOL HYDROCHLORIDE 50 MG/1
50 TABLET ORAL EVERY 8 HOURS PRN
Qty: 90 TABLET | Refills: 0 | Status: SHIPPED | OUTPATIENT
Start: 2019-12-30 | End: 2020-03-09

## 2019-12-30 NOTE — TELEPHONE ENCOUNTER
----- Message from Amisha LAYLA Nazario sent at 12/30/2019 10:52 AM CST -----  Contact: PT  PT called requesting refill on Medication:     methotrexate 2.5 MG Tab  TAKE 10 TABLETS (20 MG TOTAL) BY MOUTH EVERY 7 DAYS. Requires labs every 3 months ago.  150 tablet    traMADol (ULTRAM) 50 mg tablet  Used sparingly 1 tablet up to 4 x/day.   120 tablet  Notes to Pharmacy: Quantity prescribed more than 7 day supply? Yes    [id number] - [place] [address] AT [facility] [address]  [facility] [address]  [facility] [place][note 2] [id number]  Phone: [phone] Fax: [phone]    Callback: [phone]

## 2019-12-30 NOTE — TELEPHONE ENCOUNTER
----- Message from Mary Martinez sent at 12/30/2019  3:23 PM CST -----  Contact: SELF  Pt ask for a call from Dr Argueta    Contact info  [phone]

## 2019-12-31 DIAGNOSIS — M05.79 RHEUMATOID ARTHRITIS INVOLVING MULTIPLE SITES WITH POSITIVE RHEUMATOID FACTOR: ICD-10-CM

## 2019-12-31 RX ORDER — METHOTREXATE 2.5 MG/1
TABLET ORAL
Qty: 150 TABLET | Refills: 1 | OUTPATIENT
Start: 2019-12-31

## 2020-01-02 ENCOUNTER — TELEPHONE (OUTPATIENT)
Dept: RHEUMATOLOGY | Facility: CLINIC | Age: 70
End: 2020-01-02

## 2020-01-02 DIAGNOSIS — Z79.631 LONG TERM METHOTREXATE USER: Primary | ICD-10-CM

## 2020-01-08 LAB
BASOPHILS # BLD AUTO: 0 X10E3/UL (ref 0–0.2)
BASOPHILS NFR BLD AUTO: 1 %
EOSINOPHIL # BLD AUTO: 0.1 X10E3/UL (ref 0–0.4)
EOSINOPHIL NFR BLD AUTO: 2 %
ERYTHROCYTE [DISTWIDTH] IN BLOOD BY AUTOMATED COUNT: 16.5 % (ref 11.7–15.4)
HCT VFR BLD AUTO: 38.7 % (ref 34–46.6)
HGB BLD-MCNC: 12.5 G/DL (ref 11.1–15.9)
IMM GRANULOCYTES # BLD AUTO: 0 X10E3/UL (ref 0–0.1)
IMM GRANULOCYTES NFR BLD AUTO: 0 %
LYMPHOCYTES # BLD AUTO: 0.7 X10E3/UL (ref 0.7–3.1)
LYMPHOCYTES NFR BLD AUTO: 8 %
MCH RBC QN AUTO: 31.9 PG (ref 26.6–33)
MCHC RBC AUTO-ENTMCNC: 32.3 G/DL (ref 31.5–35.7)
MCV RBC AUTO: 99 FL (ref 79–97)
MONOCYTES # BLD AUTO: 0.7 X10E3/UL (ref 0.1–0.9)
MONOCYTES NFR BLD AUTO: 8 %
NEUTROPHILS # BLD AUTO: 6.9 X10E3/UL (ref 1.4–7)
NEUTROPHILS NFR BLD AUTO: 81 %
PLATELET # BLD AUTO: 275 X10E3/UL (ref 150–450)
RBC # BLD AUTO: 3.92 X10E6/UL (ref 3.77–5.28)
WBC # BLD AUTO: 8.5 X10E3/UL (ref 3.4–10.8)

## 2020-01-10 ENCOUNTER — TELEPHONE (OUTPATIENT)
Dept: RHEUMATOLOGY | Facility: CLINIC | Age: 70
End: 2020-01-10

## 2020-01-10 DIAGNOSIS — M05.79 RHEUMATOID ARTHRITIS INVOLVING MULTIPLE SITES WITH POSITIVE RHEUMATOID FACTOR: ICD-10-CM

## 2020-01-10 RX ORDER — METHOTREXATE 2.5 MG/1
TABLET ORAL
Qty: 150 TABLET | Refills: 1 | Status: SHIPPED | OUTPATIENT
Start: 2020-01-10 | End: 2020-01-13 | Stop reason: SDUPTHER

## 2020-01-10 NOTE — TELEPHONE ENCOUNTER
Left a message letting the pt know that Dr. Argueta said her labs were ok and that I placed a copy of the labs in the mail for her

## 2020-01-10 NOTE — TELEPHONE ENCOUNTER
----- Message from Anita Steele sent at 1/10/2020  1:49 PM CST -----  Contact: Self [phone]  Pt states she is returning nurse phone call please call back to discuss

## 2020-01-13 DIAGNOSIS — M05.79 RHEUMATOID ARTHRITIS INVOLVING MULTIPLE SITES WITH POSITIVE RHEUMATOID FACTOR: ICD-10-CM

## 2020-01-13 RX ORDER — METHOTREXATE 2.5 MG/1
TABLET ORAL
Qty: 150 TABLET | Refills: 1 | Status: SHIPPED | OUTPATIENT
Start: 2020-01-13 | End: 2020-11-12 | Stop reason: SDUPTHER

## 2020-02-05 DIAGNOSIS — K21.9 GASTROESOPHAGEAL REFLUX DISEASE WITHOUT ESOPHAGITIS: ICD-10-CM

## 2020-02-05 RX ORDER — OMEPRAZOLE 40 MG/1
CAPSULE, DELAYED RELEASE ORAL
Qty: 90 CAPSULE | Refills: 0 | Status: SHIPPED | OUTPATIENT
Start: 2020-02-05 | End: 2020-05-02

## 2020-02-27 ENCOUNTER — TELEPHONE (OUTPATIENT)
Dept: RHEUMATOLOGY | Facility: CLINIC | Age: 70
End: 2020-02-27

## 2020-02-27 NOTE — TELEPHONE ENCOUNTER
Returned patient's call regarding ICD code.  No answer.  Message left for patient to return call to clinic

## 2020-02-27 NOTE — TELEPHONE ENCOUNTER
----- Message from Diya Ryan MA sent at 2/27/2020  4:04 PM CST -----  Contact: pt      ----- Message -----  From: Johann Kiran  Sent: 2/27/2020   2:45 PM CST  To: [facility] Staff    Mg needs ICD code for order for [facility]    Contact

## 2020-02-28 ENCOUNTER — TELEPHONE (OUTPATIENT)
Dept: RHEUMATOLOGY | Facility: CLINIC | Age: 70
End: 2020-02-28

## 2020-03-06 DIAGNOSIS — M15.9 GENERALIZED OSTEOARTHRITIS OF MULTIPLE SITES: ICD-10-CM

## 2020-03-06 RX ORDER — TRAMADOL HYDROCHLORIDE 50 MG/1
50 TABLET ORAL EVERY 8 HOURS PRN
Qty: 90 TABLET | Refills: 0 | Status: CANCELLED | OUTPATIENT
Start: 2020-03-06

## 2020-03-09 DIAGNOSIS — M15.9 GENERALIZED OSTEOARTHRITIS OF MULTIPLE SITES: ICD-10-CM

## 2020-03-09 RX ORDER — TRAMADOL HYDROCHLORIDE 50 MG/1
50 TABLET ORAL EVERY 12 HOURS PRN
Qty: 60 TABLET | Refills: 2 | Status: SHIPPED | OUTPATIENT
Start: 2020-03-09 | End: 2020-03-11 | Stop reason: SDUPTHER

## 2020-03-10 RX ORDER — TRAMADOL HYDROCHLORIDE 50 MG/1
50 TABLET ORAL EVERY 8 HOURS PRN
Qty: 90 TABLET | Refills: 0 | Status: SHIPPED | OUTPATIENT
Start: 2020-03-10 | End: 2020-07-14

## 2020-03-11 ENCOUNTER — TELEPHONE (OUTPATIENT)
Dept: RHEUMATOLOGY | Facility: CLINIC | Age: 70
End: 2020-03-11

## 2020-03-11 DIAGNOSIS — M15.9 GENERALIZED OSTEOARTHRITIS OF MULTIPLE SITES: ICD-10-CM

## 2020-03-11 RX ORDER — TRAMADOL HYDROCHLORIDE 50 MG/1
50 TABLET ORAL EVERY 12 HOURS PRN
Qty: 60 TABLET | Refills: 2 | Status: SHIPPED | OUTPATIENT
Start: 2020-03-11 | End: 2020-07-13 | Stop reason: SDUPTHER

## 2020-03-28 DIAGNOSIS — M05.79 RHEUMATOID ARTHRITIS INVOLVING MULTIPLE SITES WITH POSITIVE RHEUMATOID FACTOR: ICD-10-CM

## 2020-03-29 RX ORDER — PREDNISONE 5 MG/1
TABLET ORAL
Qty: 90 TABLET | Refills: 0 | Status: SHIPPED | OUTPATIENT
Start: 2020-03-29 | End: 2020-06-23

## 2020-05-02 DIAGNOSIS — K21.9 GASTROESOPHAGEAL REFLUX DISEASE WITHOUT ESOPHAGITIS: ICD-10-CM

## 2020-05-02 RX ORDER — OMEPRAZOLE 40 MG/1
CAPSULE, DELAYED RELEASE ORAL
Qty: 90 CAPSULE | Refills: 0 | Status: SHIPPED | OUTPATIENT
Start: 2020-05-02 | End: 2020-08-02

## 2020-07-13 DIAGNOSIS — M15.9 GENERALIZED OSTEOARTHRITIS OF MULTIPLE SITES: ICD-10-CM

## 2020-07-14 RX ORDER — TRAMADOL HYDROCHLORIDE 50 MG/1
50 TABLET ORAL
Qty: 30 TABLET | Refills: 2 | Status: SHIPPED | OUTPATIENT
Start: 2020-07-14 | End: 2020-07-15 | Stop reason: SDUPTHER

## 2020-07-15 DIAGNOSIS — M15.9 GENERALIZED OSTEOARTHRITIS OF MULTIPLE SITES: ICD-10-CM

## 2020-07-15 RX ORDER — TRAMADOL HYDROCHLORIDE 50 MG/1
50 TABLET ORAL
Qty: 30 TABLET | Refills: 2 | Status: SHIPPED | OUTPATIENT
Start: 2020-07-15 | End: 2020-07-29 | Stop reason: SDUPTHER

## 2020-07-29 DIAGNOSIS — M15.9 GENERALIZED OSTEOARTHRITIS OF MULTIPLE SITES: ICD-10-CM

## 2020-07-30 RX ORDER — TRAMADOL HYDROCHLORIDE 50 MG/1
50 TABLET ORAL
Qty: 30 TABLET | Refills: 2 | Status: SHIPPED | OUTPATIENT
Start: 2020-07-30

## 2020-08-02 DIAGNOSIS — K21.9 GASTROESOPHAGEAL REFLUX DISEASE WITHOUT ESOPHAGITIS: ICD-10-CM

## 2020-08-02 DIAGNOSIS — M05.79 RHEUMATOID ARTHRITIS INVOLVING MULTIPLE SITES WITH POSITIVE RHEUMATOID FACTOR: ICD-10-CM

## 2020-08-02 RX ORDER — METHOTREXATE 2.5 MG/1
TABLET ORAL
Qty: 150 TABLET | Refills: 1 | OUTPATIENT
Start: 2020-08-02

## 2020-08-02 RX ORDER — OMEPRAZOLE 40 MG/1
CAPSULE, DELAYED RELEASE ORAL
Qty: 90 CAPSULE | Refills: 0 | Status: SHIPPED | OUTPATIENT
Start: 2020-08-02 | End: 2020-11-12 | Stop reason: SDUPTHER

## 2020-08-04 DIAGNOSIS — M05.79 RHEUMATOID ARTHRITIS INVOLVING MULTIPLE SITES WITH POSITIVE RHEUMATOID FACTOR: ICD-10-CM

## 2020-08-04 RX ORDER — METHOTREXATE 2.5 MG/1
TABLET ORAL
Qty: 150 TABLET | Refills: 1 | OUTPATIENT
Start: 2020-08-04

## 2020-09-18 ENCOUNTER — TELEPHONE (OUTPATIENT)
Dept: RHEUMATOLOGY | Facility: CLINIC | Age: 70
End: 2020-09-18

## 2020-09-18 NOTE — TELEPHONE ENCOUNTER
----- Message from Felicitas Gibson MA sent at 9/18/2020  1:14 PM CDT -----  Regarding: FW: ADVISE  Contact: self    ----- Message -----  From: Mary Martinez  Sent: 9/18/2020  10:04 AM CDT  To: [facility] Staff  Subject: ADVISE                                           Pt ask for a  call w/ results Pt states she have been calling all week for results and have not received a response as of yet      Contact info   [phone]

## 2020-11-09 DIAGNOSIS — K21.9 GASTROESOPHAGEAL REFLUX DISEASE WITHOUT ESOPHAGITIS: ICD-10-CM

## 2020-11-10 RX ORDER — OMEPRAZOLE 40 MG/1
40 CAPSULE, DELAYED RELEASE ORAL DAILY
Qty: 90 CAPSULE | Refills: 0 | OUTPATIENT
Start: 2020-11-10

## 2020-11-12 ENCOUNTER — TELEPHONE (OUTPATIENT)
Dept: RHEUMATOLOGY | Facility: CLINIC | Age: 70
End: 2020-11-12

## 2020-11-12 DIAGNOSIS — M05.79 RHEUMATOID ARTHRITIS INVOLVING MULTIPLE SITES WITH POSITIVE RHEUMATOID FACTOR: Primary | ICD-10-CM

## 2020-11-12 DIAGNOSIS — Z79.631 LONG TERM METHOTREXATE USER: Primary | ICD-10-CM

## 2020-11-12 DIAGNOSIS — K21.9 GASTROESOPHAGEAL REFLUX DISEASE WITHOUT ESOPHAGITIS: ICD-10-CM

## 2020-11-12 DIAGNOSIS — Z79.631 LONG TERM METHOTREXATE USER: ICD-10-CM

## 2020-11-12 DIAGNOSIS — M05.79 RHEUMATOID ARTHRITIS INVOLVING MULTIPLE SITES WITH POSITIVE RHEUMATOID FACTOR: ICD-10-CM

## 2020-11-12 RX ORDER — METHOTREXATE 2.5 MG/1
TABLET ORAL
Qty: 150 TABLET | Refills: 1 | Status: SHIPPED | OUTPATIENT
Start: 2020-11-12 | End: 2021-06-07 | Stop reason: SDUPTHER

## 2020-11-12 RX ORDER — FOLIC ACID 1 MG/1
1000 TABLET ORAL DAILY
Qty: 90 TABLET | Refills: 3 | Status: SHIPPED | OUTPATIENT
Start: 2020-11-12 | End: 2020-11-12 | Stop reason: SDUPTHER

## 2020-11-12 RX ORDER — METHOTREXATE 2.5 MG/1
TABLET ORAL
Qty: 150 TABLET | Refills: 1 | Status: SHIPPED | OUTPATIENT
Start: 2020-11-12 | End: 2020-11-12 | Stop reason: SDUPTHER

## 2020-11-12 RX ORDER — FOLIC ACID 1 MG/1
1000 TABLET ORAL DAILY
Qty: 90 TABLET | Refills: 3 | Status: SHIPPED | OUTPATIENT
Start: 2020-11-12 | End: 2021-06-24 | Stop reason: SDUPTHER

## 2020-11-12 RX ORDER — OMEPRAZOLE 40 MG/1
40 CAPSULE, DELAYED RELEASE ORAL DAILY
Qty: 90 CAPSULE | Refills: 0 | Status: SHIPPED | OUTPATIENT
Start: 2020-11-12 | End: 2021-02-09 | Stop reason: SDUPTHER

## 2020-11-12 NOTE — TELEPHONE ENCOUNTER
[facility]: 8/25/20: ESR 31 (40); CRP 6 (10); CBC ok; CMP ok    Needs new labs before 11/25/20.    Rx: MTX & omeprazole

## 2020-11-24 LAB — CRP SERPL-MCNC: 4 MG/L (ref 0–10)

## 2020-11-26 NOTE — PROGRESS NOTES
Subjective:       Patient ID: Cata Peoples is a 70 y.o. female with sero+ccp+RA on MTX and APS (warfarin); Also OA especially of the hands.     Chief Complaint: No chief complaint on file.      Orthopedic surgeon: [facility]. [facility]  Returns for follow-up. Last seen 10/29/19. Still on MTX taking 20 mg/wk + folic acid 1 mg/d and doing very well RA wise. No adverse MTX effects. But also still on prednisone and is reluctant to cut down below 5 mg/d as it also helps her back..   AM stiffness is only in low back. Denies peripheral joint pain or swelling.. Denies Raynaud's, dysphagia, tight skin, alopecia, oral ulcers, dry mouth, pleurisy, pericarditis, photosensitivity, skin rashes, new thromboses (had DVT behind right knee in May 2015 while on xarelto; is on chronic warfarin). Has mild dry eyes.     She has osteoporosis and had a dXA and was started on denosumab y Dr. Heaton her orthopedic surgeon.  Still driving her car and taking 2 of her grandchildren to school.   Otherwise, stays home.         Current Outpatient Medications   Medication Sig Dispense Refill    acetaminophen (TYLENOL) 500 MG tablet Take 500 mg by mouth 2 (two) times daily.      biotin 1 mg Cap Take by mouth.      FLUZONE HIGH-DOSE 2016-17, PF, 180 mcg/0.5 mL Syrg TO BE ADMINISTERED BY PHARMACIST FOR IMMUNIZATION  0    folic acid (FOLVITE) 1 MG tablet Take 1 tablet (1,000 mcg total) by mouth once daily. 90 tablet 3    latanoprost 0.005 % ophthalmic solution Place 1 drop into both eyes nightly.  12    methotrexate 2.5 MG Tab TAKE 10 TABLETS (20 MG TOTAL) BY MOUTH EVERY 7 DAYS. Requires labs every 3 months ago. 150 tablet 1    metoprolol tartrate (LOPRESSOR) 50 MG tablet Take 50 mg by mouth 2 (two) times daily.  5    multivitamin (ONE DAILY MULTIVITAMIN) per tablet Take 1 tablet by mouth once daily.      omeprazole (PRILOSEC) 40 MG capsule Take 1 capsule (40 mg total) by mouth once daily. 90 capsule 0    predniSONE  (DELTASONE) 5 MG tablet TAKE 1 TABLET(5 MG) BY MOUTH EVERY DAY 90 tablet 0    predniSONE (DELTASONE) 5 MG tablet TAKE 1 TABLET(5 MG) BY MOUTH EVERY DAY 90 tablet 1    propafenone (RHTHYMOL) 150 MG Tab 150 mg every 8 (eight) hours.       traMADoL (ULTRAM) 50 mg tablet Take 1 tablet (50 mg total) by mouth every 24 hours as needed for Pain. In the process of weaning off. 30 tablet 2    vitamin D 1000 units Tab Take 185 mg by mouth once daily.      warfarin (COUMADIN) 4 MG tablet   3     No current facility-administered medications for this visit.          Review of patient's allergies indicates:   Allergen Reactions    Ambien [zolpidem]      Hallucinations     Past Medical History:   Diagnosis Date    *Atrial fibrillation     Acid reflux     Allergy     Anxiety     Arthritis     Deep vein thrombosis     Depression     Pulmonary embolism      Past Surgical History:   Procedure Laterality Date    JOINT REPLACEMENT      knee x4 replacements-2 R, and 2 L knee         Review of Systems   Constitutional: Negative.  Negative for diaphoresis, fatigue, fever and unexpected weight change.   HENT: Positive for tinnitus. Negative for dental problem, mouth sores, nosebleeds and sinus pressure.    Eyes: Negative.  Negative for pain, itching and visual disturbance.        Dry eyes   Respiratory: Negative.  Negative for cough, chest tightness and wheezing.    Cardiovascular: Negative.  Negative for chest pain and leg swelling.   Gastrointestinal: Negative.  Negative for blood in stool, constipation, diarrhea and vomiting.   Endocrine: Negative.    Genitourinary: Positive for enuresis. Negative for dysuria, hematuria and urgency.        Overactive bladder; recurrent asymptomatic UTIs.   Musculoskeletal: Positive for back pain and gait problem (uses cane). Negative for arthralgias, joint swelling, myalgias, neck pain and neck stiffness.   Skin: Negative.  Negative for pallor and rash.        Ecchymoses  Dry skin  "  Allergic/Immunologic: Negative.    Neurological: Negative for dizziness, weakness, light-headedness and headaches.   Hematological: Bruises/bleeds easily.   Psychiatric/Behavioral: Negative.  Negative for dysphoric mood and sleep disturbance (sleeps very well). The patient is not nervous/anxious.        Family History   Problem Relation Age of Onset    Cancer Mother         lung    Heart disease Father     Kidney disease Father      Has 7 grandchildren: takes 9 & 12 yr old to school every AM.  Social History     Tobacco Use    Smoking status: Never Smoker    Smokeless tobacco: Never Used   Substance Use Topics    Alcohol use: No    Drug use: No         Objective:   /80   Pulse 64   Ht 5' 9" (1.753 m)   BMI 33.73 kg/m²   Was 229 lb on 4/30/19  Physical Exam   Constitutional: She is oriented to person, place, and time and well-developed, well-nourished, and in no distress. No distress.   HENT:   Head: Normocephalic and atraumatic.   Mouth/Throat: Oropharynx is clear and moist. No oropharyngeal exudate.        Eyes: Conjunctivae and EOM are normal. Pupils are equal, round, and reactive to light. No scleral icterus.   Neck: Neck supple. No JVD present. No tracheal deviation present. No thyromegaly present.   Cardiovascular: Normal rate, regular rhythm, normal heart sounds and intact distal pulses.  Exam reveals no gallop and no friction rub.    No murmur heard.  Pulmonary/Chest: Effort normal and breath sounds normal. No respiratory distress. She has no wheezes. She has no rales. She exhibits no tenderness.   Abdominal: Soft. She exhibits no distension and no mass. There is no splenomegaly or hepatomegaly. There is no abdominal tenderness. There is no rebound and no guarding.   Lymphadenopathy:     She has no cervical adenopathy.   Neurological: She is alert and oriented to person, place, and time. She has normal reflexes. She displays normal reflexes. No cranial nerve deficit. She exhibits normal " muscle tone.   Cannot get up from a chair without using arms.  Feels in part this is due to low back issues.    Skin: Skin is warm and dry. She is not diaphoretic.     Psychiatric: Mood, memory, affect and judgment normal.   Musculoskeletal: Deformity (RA hand changes) and edema (1+ pedal & pretibial) present. No tenderness.      Comments: Lumbar spine no midline tenderness.  No synovitis anywhere.  Still bilateral crepitus TMJ. Mild decrease in   rotation of the cervical spine. Shoulders are unremarkable. Elbows with mild R flexion contraction; minimal decrease in extension on L; Left wrist & 2,3 & 4th joints MCPs minimal SHT; PIPs with prominent Fam nodes and zigzagging. DIPs with prominent Heberden nodes and zigzagging.  Right hip with scar and with limitation of internal, external, rotation, flexion. Left also with some limitation to lesser degree.Knees with bilateral scars and bony hypertrophy and   crepitus with no synovitis. Ankles are with limited motion. No tenderness. Heels and toes are without metatarsalgia.                  LABS:   8/25/20 ([facility]) ESR 31 (40); CRP 6 (10); CBC ok; CMP ok  1/7/20: CBC ok;   12/19/19:CRP 2.4; CMP glu 105; Ca 10.4  9/19/19: ESR 12; CRP 8 (10); CBC ok; CMP Ca 10.5;   4/22/19 ([facility]): ESR 14; CRP 5.3(4.9); CBC ok; CMP ok;  11/19/18: ([facility]): ESR 11; CRP 4.6 (4.9); CBC ok; CMP glu 100  8/1/18 ([facility]): ESR 8; CRP 4.9 (4.9); CBC ok; hi indices; CMP ok  1/2/18: ESR 14;CRP 3.3; CBC ok; CMP ok  8/16/17: ESR 36; CRP 46.3; CBC ok; CMP  5/19/17: Vit D 48;   2/13/17: ESR 25; CRP 36.8; CBC eos 10.1%; CMP ok;   11/1/16: ESR 12; CRP 4.4; CBC ok; CMP ok;   7/26/16: CPR 11.9; CBC ok; CMP ok;  1/6/16: ESR 23; CRP 5.7; Hg 11.6; CMP ok;   3/17/15: ESR 25; CRP 14.7; Hg 11; Ht 34.7; Alb. 3.3  12/17/14: ESR18;CRP 4; CBC ok; CMP ok; Vit D 23  9/17/14: ESR 15; CRP 4.9; CBC ok; CMP ok;   6/18/14: ESR 22; CRP 3.7; Vit D 26; +LAC; neg aCLA & beta 2 glyco;   12/9/13: ESR 30; CRP 20.5; CBC,  CMP ok; Vit D 31;   9/26/23: +LAC  9/23/13: ESR 10; CRP 4.4; CBC, CMP ok;  6/20/13: ESR 30; CRP 18.4; CBC, CMP ok;   LAC neg; HEX +; aCLA & beta 2 neg;          3/10/20: DIS DXA: TLS 0.3; TTH -2.2 (14.3% decrease); hi fx risk. R forearm -4.4;    12/15/17 DIS DXA: TLS 0; TFN -1.5; TTH-1.3;   1/20/15 DIS DXA: TLS 1.1; TFN -0.6; TTH -0.7    1/15/18 (DIS): T spine 3 views: compression fx w anterior wedging of the T11 vertebral body; atherosclerotic disease; rotatory scoliosis & spondylosis of the osteopenic/osteoporotic thoracic & upper lumbar spine.  6/18/14: Arthritis Survey personally reviewed: OA & RA with Bilateral TKRs with ? loosening L TKR;   MRI from 12/12: Extensive pannus in the right hip joint, suggesting rheumatoid arthritis. There is prominent associated marrow edema within the femoral head and a portion of the acetabulum. Small focus of decreased T1/T2 signal in the right femoral head, suggesting avascular necrosis (less than 25% involvement of the femoral head). Mild pannus in the left hip joint. No MR findings of osteonecrosis in the left femoral head.    Assessment:   Seropositive and CCP positive rheumatoid arthritis involving hands--very stable,    feet, elbows, and wrists,    well controlled on prednisone 5 mg/d, MTX 15/wk and folic acid 1 mg/d.   Instructed pt to try and cut down prednisone alternating 5mg and 2.5mg   Increased MTX to 20/wk for better control of RA   Off Enbrel since 12/14.    Had multiple UTIs on it & cellulitis of one of her legs.    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     No hx of miscarriages or other thrombotic event   +LAC (neg aCLA & beta 2 glyc);      On coumadin - INR checks [id number]; off xarelto due to cost.      Osteoporosis    Wedge compression of T 11 by hx by imaging  11/20/17 & 1/15/18   3/10/20: DIS DXA: TLS 0.3; TTH -2.2 (14.3% decrease); hi fx risk. R forearm -4.4;     12/15/17 DIS DXA: TLS 0; TFN -1.5; TTH-1.3;    1/20/15 DIS DXA: TLS 1.1; TFN -0.6; TTH -0.7    Vitamin D insufficiency--recurrent;     Pedal edema   Due to venous insufficiency & weight.     Generalized osteoarthritis:   Low back pain secondary to degenerative disk disease especially involving L2-L3, but also L3-L4 with scoliosis and retrolisthesis.     Also with sacroiliac area tender points.   Status post bilateral knee replacements with repeat revision on the right January 2010 requiring blood transfusion.    S/P R THR 3/18/14.   Severe degenerative joint disease of the hands.     Fully functional    Depressive disorder--stable    Hypertension.     GERD     Obesity       Plan:   Ok to continue w. MTX 20 mg/wk + folic acid 1 mg/d with labs every 3 months; next 3/2/21 or before.([facility]--needs forms sent to her as they did not print out).  Easy on prednisone: try alternating 5 mg with 2.5 mg/d.  Continue denosumab as per her orthopedic surgeon.  Continue dietary calcium and vitamin D.  Keep active.  RTC 6 months as per patient's request

## 2020-12-02 ENCOUNTER — OFFICE VISIT (OUTPATIENT)
Dept: RHEUMATOLOGY | Facility: CLINIC | Age: 70
End: 2020-12-02
Payer: MEDICARE

## 2020-12-02 VITALS
HEIGHT: 69 IN | DIASTOLIC BLOOD PRESSURE: 80 MMHG | HEART RATE: 64 BPM | BODY MASS INDEX: 33.73 KG/M2 | SYSTOLIC BLOOD PRESSURE: 110 MMHG

## 2020-12-02 DIAGNOSIS — M05.79 RHEUMATOID ARTHRITIS INVOLVING MULTIPLE SITES WITH POSITIVE RHEUMATOID FACTOR: Primary | ICD-10-CM

## 2020-12-02 DIAGNOSIS — M81.0 AGE-RELATED OSTEOPOROSIS WITHOUT CURRENT PATHOLOGICAL FRACTURE: ICD-10-CM

## 2020-12-02 DIAGNOSIS — M15.9 GENERALIZED OSTEOARTHRITIS OF MULTIPLE SITES: ICD-10-CM

## 2020-12-02 DIAGNOSIS — Z79.631 LONG TERM METHOTREXATE USER: ICD-10-CM

## 2020-12-02 DIAGNOSIS — Z79.52 LONG TERM CURRENT USE OF SYSTEMIC STEROIDS: ICD-10-CM

## 2020-12-02 DIAGNOSIS — D68.61 ANTI-PHOSPHOLIPID SYNDROME: ICD-10-CM

## 2020-12-02 PROCEDURE — 1125F AMNT PAIN NOTED PAIN PRSNT: CPT | Mod: S$GLB,,, | Performed by: INTERNAL MEDICINE

## 2020-12-02 PROCEDURE — 99999 PR PBB SHADOW E&M-EST. PATIENT-LVL IV: CPT | Mod: PBBFAC,,, | Performed by: INTERNAL MEDICINE

## 2020-12-02 PROCEDURE — 1125F PR PAIN SEVERITY QUANTIFIED, PAIN PRESENT: ICD-10-PCS | Mod: S$GLB,,, | Performed by: INTERNAL MEDICINE

## 2020-12-02 PROCEDURE — 3074F SYST BP LT 130 MM HG: CPT | Mod: CPTII,S$GLB,, | Performed by: INTERNAL MEDICINE

## 2020-12-02 PROCEDURE — 99214 PR OFFICE/OUTPT VISIT, EST, LEVL IV, 30-39 MIN: ICD-10-PCS | Mod: S$GLB,,, | Performed by: INTERNAL MEDICINE

## 2020-12-02 PROCEDURE — 3079F PR MOST RECENT DIASTOLIC BLOOD PRESSURE 80-89 MM HG: ICD-10-PCS | Mod: CPTII,S$GLB,, | Performed by: INTERNAL MEDICINE

## 2020-12-02 PROCEDURE — 1159F MED LIST DOCD IN RCRD: CPT | Mod: S$GLB,,, | Performed by: INTERNAL MEDICINE

## 2020-12-02 PROCEDURE — 3079F DIAST BP 80-89 MM HG: CPT | Mod: CPTII,S$GLB,, | Performed by: INTERNAL MEDICINE

## 2020-12-02 PROCEDURE — 99999 PR PBB SHADOW E&M-EST. PATIENT-LVL IV: ICD-10-PCS | Mod: PBBFAC,,, | Performed by: INTERNAL MEDICINE

## 2020-12-02 PROCEDURE — 3074F PR MOST RECENT SYSTOLIC BLOOD PRESSURE < 130 MM HG: ICD-10-PCS | Mod: CPTII,S$GLB,, | Performed by: INTERNAL MEDICINE

## 2020-12-02 PROCEDURE — 3008F PR BODY MASS INDEX (BMI) DOCUMENTED: ICD-10-PCS | Mod: CPTII,S$GLB,, | Performed by: INTERNAL MEDICINE

## 2020-12-02 PROCEDURE — 3008F BODY MASS INDEX DOCD: CPT | Mod: CPTII,S$GLB,, | Performed by: INTERNAL MEDICINE

## 2020-12-02 PROCEDURE — 99214 OFFICE O/P EST MOD 30 MIN: CPT | Mod: S$GLB,,, | Performed by: INTERNAL MEDICINE

## 2020-12-02 PROCEDURE — 1159F PR MEDICATION LIST DOCUMENTED IN MEDICAL RECORD: ICD-10-PCS | Mod: S$GLB,,, | Performed by: INTERNAL MEDICINE

## 2020-12-02 RX ORDER — PREDNISONE 2.5 MG/1
2.5 TABLET ORAL 2 TIMES DAILY
Qty: 180 TABLET | Refills: 1 | Status: SHIPPED | OUTPATIENT
Start: 2020-12-02 | End: 2021-03-11 | Stop reason: SDUPTHER

## 2020-12-02 RX ORDER — IBUPROFEN 100 MG/5ML
1000 SUSPENSION, ORAL (FINAL DOSE FORM) ORAL DAILY
COMMUNITY

## 2020-12-02 RX ORDER — CALCIUM CARBONATE 600 MG
600 TABLET ORAL ONCE
COMMUNITY

## 2020-12-02 ASSESSMENT — ROUTINE ASSESSMENT OF PATIENT INDEX DATA (RAPID3)
MDHAQ FUNCTION SCORE: 0.9
PATIENT GLOBAL ASSESSMENT SCORE: 6
FATIGUE SCORE: 9
TOTAL RAPID3 SCORE: 5.83
PSYCHOLOGICAL DISTRESS SCORE: 2.2
PAIN SCORE: 8.5
AM STIFFNESS SCORE: 1, YES

## 2020-12-02 NOTE — PATIENT INSTRUCTIONS
Try taking 5 mg of prednisone alternating with 2.5 mg of prednisone.  See if you can get by this way.       You need labs every 3 months on methotrexate.  You are due for labs on or before 3/2/21 & 6/2/21

## 2020-12-03 ENCOUNTER — TELEPHONE (OUTPATIENT)
Dept: RHEUMATOLOGY | Facility: CLINIC | Age: 70
End: 2020-12-03

## 2020-12-03 DIAGNOSIS — Z79.631 LONG TERM METHOTREXATE USER: Primary | ICD-10-CM

## 2021-02-09 DIAGNOSIS — K21.9 GASTROESOPHAGEAL REFLUX DISEASE WITHOUT ESOPHAGITIS: ICD-10-CM

## 2021-02-09 RX ORDER — OMEPRAZOLE 40 MG/1
40 CAPSULE, DELAYED RELEASE ORAL DAILY
Qty: 90 CAPSULE | Refills: 0 | Status: SHIPPED | OUTPATIENT
Start: 2021-02-09 | End: 2021-05-11 | Stop reason: SDUPTHER

## 2021-03-05 ENCOUNTER — IMMUNIZATION (OUTPATIENT)
Dept: PRIMARY CARE CLINIC | Facility: CLINIC | Age: 71
End: 2021-03-05

## 2021-03-05 DIAGNOSIS — Z23 NEED FOR VACCINATION: Primary | ICD-10-CM

## 2021-03-05 PROCEDURE — 0031A PR IMMUNIZ ADMIN, SARS-COV-2 COVID-19 VACC, 5X10VP/0.5ML: CPT | Mod: CV19,S$GLB,, | Performed by: INTERNAL MEDICINE

## 2021-03-05 PROCEDURE — 91303 PR SARSCOV2 VAC AD26 .5ML IM: CPT | Mod: S$GLB,,, | Performed by: INTERNAL MEDICINE

## 2021-03-05 PROCEDURE — 0031A PR IMMUNIZ ADMIN, SARS-COV-2 COVID-19 VACC, 5X10VP/0.5ML: ICD-10-PCS | Mod: CV19,S$GLB,, | Performed by: INTERNAL MEDICINE

## 2021-03-05 PROCEDURE — 91303 PR SARSCOV2 VAC AD26 .5ML IM: ICD-10-PCS | Mod: S$GLB,,, | Performed by: INTERNAL MEDICINE

## 2021-03-11 DIAGNOSIS — Z79.631 LONG TERM METHOTREXATE USER: ICD-10-CM

## 2021-03-11 DIAGNOSIS — M05.79 RHEUMATOID ARTHRITIS INVOLVING MULTIPLE SITES WITH POSITIVE RHEUMATOID FACTOR: ICD-10-CM

## 2021-03-11 RX ORDER — PREDNISONE 2.5 MG/1
2.5 TABLET ORAL 2 TIMES DAILY
Qty: 180 TABLET | Refills: 1 | Status: SHIPPED | OUTPATIENT
Start: 2021-03-11 | End: 2022-01-12 | Stop reason: SDUPTHER

## 2021-04-12 ENCOUNTER — TELEPHONE (OUTPATIENT)
Dept: RHEUMATOLOGY | Facility: CLINIC | Age: 71
End: 2021-04-12

## 2021-05-11 DIAGNOSIS — K21.9 GASTROESOPHAGEAL REFLUX DISEASE WITHOUT ESOPHAGITIS: ICD-10-CM

## 2021-05-11 RX ORDER — OMEPRAZOLE 40 MG/1
40 CAPSULE, DELAYED RELEASE ORAL DAILY
Qty: 90 CAPSULE | Refills: 0 | Status: SHIPPED | OUTPATIENT
Start: 2021-05-11 | End: 2021-08-06

## 2021-06-07 ENCOUNTER — TELEPHONE (OUTPATIENT)
Dept: RHEUMATOLOGY | Facility: CLINIC | Age: 71
End: 2021-06-07

## 2021-06-07 DIAGNOSIS — M05.79 RHEUMATOID ARTHRITIS INVOLVING MULTIPLE SITES WITH POSITIVE RHEUMATOID FACTOR: ICD-10-CM

## 2021-06-07 RX ORDER — METHOTREXATE 2.5 MG/1
TABLET ORAL
Qty: 150 TABLET | Refills: 0 | Status: SHIPPED | OUTPATIENT
Start: 2021-06-07

## 2021-06-24 ENCOUNTER — OFFICE VISIT (OUTPATIENT)
Dept: RHEUMATOLOGY | Facility: CLINIC | Age: 71
End: 2021-06-24
Payer: MEDICARE

## 2021-06-24 VITALS
WEIGHT: 228 LBS | HEART RATE: 77 BPM | SYSTOLIC BLOOD PRESSURE: 128 MMHG | HEIGHT: 67 IN | DIASTOLIC BLOOD PRESSURE: 81 MMHG | BODY MASS INDEX: 35.79 KG/M2

## 2021-06-24 DIAGNOSIS — M05.79 RHEUMATOID ARTHRITIS INVOLVING MULTIPLE SITES WITH POSITIVE RHEUMATOID FACTOR: Primary | ICD-10-CM

## 2021-06-24 DIAGNOSIS — M15.9 GENERALIZED OSTEOARTHRITIS OF MULTIPLE SITES: ICD-10-CM

## 2021-06-24 DIAGNOSIS — D68.61 ANTI-PHOSPHOLIPID SYNDROME: ICD-10-CM

## 2021-06-24 DIAGNOSIS — Z79.631 LONG TERM METHOTREXATE USER: ICD-10-CM

## 2021-06-24 PROCEDURE — 3008F PR BODY MASS INDEX (BMI) DOCUMENTED: ICD-10-PCS | Mod: CPTII,S$GLB,, | Performed by: INTERNAL MEDICINE

## 2021-06-24 PROCEDURE — 1159F MED LIST DOCD IN RCRD: CPT | Mod: S$GLB,,, | Performed by: INTERNAL MEDICINE

## 2021-06-24 PROCEDURE — 1125F AMNT PAIN NOTED PAIN PRSNT: CPT | Mod: S$GLB,,, | Performed by: INTERNAL MEDICINE

## 2021-06-24 PROCEDURE — 99214 OFFICE O/P EST MOD 30 MIN: CPT | Mod: S$GLB,,, | Performed by: INTERNAL MEDICINE

## 2021-06-24 PROCEDURE — 1125F PR PAIN SEVERITY QUANTIFIED, PAIN PRESENT: ICD-10-PCS | Mod: S$GLB,,, | Performed by: INTERNAL MEDICINE

## 2021-06-24 PROCEDURE — 99999 PR PBB SHADOW E&M-EST. PATIENT-LVL IV: CPT | Mod: PBBFAC,,, | Performed by: INTERNAL MEDICINE

## 2021-06-24 PROCEDURE — 99999 PR PBB SHADOW E&M-EST. PATIENT-LVL IV: ICD-10-PCS | Mod: PBBFAC,,, | Performed by: INTERNAL MEDICINE

## 2021-06-24 PROCEDURE — 3008F BODY MASS INDEX DOCD: CPT | Mod: CPTII,S$GLB,, | Performed by: INTERNAL MEDICINE

## 2021-06-24 PROCEDURE — 1159F PR MEDICATION LIST DOCUMENTED IN MEDICAL RECORD: ICD-10-PCS | Mod: S$GLB,,, | Performed by: INTERNAL MEDICINE

## 2021-06-24 PROCEDURE — 99214 PR OFFICE/OUTPT VISIT, EST, LEVL IV, 30-39 MIN: ICD-10-PCS | Mod: S$GLB,,, | Performed by: INTERNAL MEDICINE

## 2021-06-24 RX ORDER — METHOTREXATE 2.5 MG/1
25 TABLET ORAL
Qty: 150 TABLET | Refills: 2 | Status: SHIPPED | OUTPATIENT
Start: 2021-06-24 | End: 2021-09-23 | Stop reason: SDUPTHER

## 2021-06-24 RX ORDER — FOLIC ACID 1 MG/1
1000 TABLET ORAL DAILY
Qty: 90 TABLET | Refills: 3 | Status: SHIPPED | OUTPATIENT
Start: 2021-06-24 | End: 2021-11-12

## 2021-06-24 RX ORDER — PREDNISONE 2.5 MG/1
2.5 TABLET ORAL DAILY
Qty: 90 TABLET | Refills: 0 | Status: SHIPPED | OUTPATIENT
Start: 2021-06-24 | End: 2021-09-19

## 2021-06-24 ASSESSMENT — ROUTINE ASSESSMENT OF PATIENT INDEX DATA (RAPID3)
FATIGUE SCORE: 2
AM STIFFNESS SCORE: 1, YES
PATIENT GLOBAL ASSESSMENT SCORE: 7
MDHAQ FUNCTION SCORE: 1.5
TOTAL RAPID3 SCORE: 6.5
PSYCHOLOGICAL DISTRESS SCORE: 0
PAIN SCORE: 7.5

## 2021-09-21 DIAGNOSIS — Z79.631 LONG TERM METHOTREXATE USER: ICD-10-CM

## 2021-09-21 RX ORDER — METHOTREXATE 2.5 MG/1
25 TABLET ORAL
Qty: 150 TABLET | Refills: 2 | OUTPATIENT
Start: 2021-09-21 | End: 2022-09-21

## 2021-09-23 DIAGNOSIS — Z79.631 LONG TERM METHOTREXATE USER: ICD-10-CM

## 2021-09-23 RX ORDER — METHOTREXATE 2.5 MG/1
25 TABLET ORAL
Qty: 150 TABLET | Refills: 2 | Status: SHIPPED | OUTPATIENT
Start: 2021-09-23 | End: 2022-09-23

## 2022-01-12 DIAGNOSIS — Z79.631 LONG TERM METHOTREXATE USER: ICD-10-CM

## 2022-01-12 DIAGNOSIS — M05.79 RHEUMATOID ARTHRITIS INVOLVING MULTIPLE SITES WITH POSITIVE RHEUMATOID FACTOR: ICD-10-CM

## 2022-01-12 RX ORDER — PREDNISONE 2.5 MG/1
2.5 TABLET ORAL 2 TIMES DAILY
Qty: 180 TABLET | Refills: 1 | Status: SHIPPED | OUTPATIENT
Start: 2022-01-12

## 2022-02-19 NOTE — PROGRESS NOTES
Subjective:    The patient location is: Home  The chief complaint leading to consultation is: RA on MTX & prednisone 2.5 mg/d.  Visit type:synchronous video & audio  Face to Face time with patient: 15 minutes (finished audio)  35 minutes of total time spent on the encounter, which includes face to face time and non-face to face time preparing to see the patient (eg, review of tests), Obtaining and/or reviewing separately obtained history, Documenting clinical information in the electronic or other health record, Independently interpreting results (not separately reported) and communicating results to the patient/family/caregiver, or Care coordination (not separately reported).     Each patient to whom he or she provides medical services by telemedicine is:  (1) informed of the relationship between the physician and patient and the respective role of any other health care provider with respect to management of the patient; and (2) notified that he or she may decline to receive medical services by telemedicine and may withdraw from such care at any time.         Patient ID: Cata Peoples is a 71 y.o. female with sero+ccp+RA on MTX and APS (apixaban); Also OA especially of the hands.     Chief Complaint: No chief complaint on file.    Returns for TM follow-up. Last seen 6/24/21.  On MTX 25 mg/wk + folic acid 1 mg/d and now down to prednisone 2.5 mg/d. States she cannot go down any further as it helps her back.    Joints doing well with minimal AM stiffness. Denies peripheral joint pain or swelling.. Denies Raynaud's, dysphagia, tight skin, alopecia, oral ulcers, dry mouth, pleurisy, pericarditis, photosensitivity, skin rashes, new thromboses (had DVT behind right knee in May 2015 while on xarelto; was on chronic warfarin; now switched to apixaban). Has mild dry eyes.     Taking denosumab for osteoporosis, handled by Dr. Heaton at [facility]..    In January 2022, was hospitalized at [facility] for altered  MS and was found to have Covid 19 & UTI and was treated with antibiotics, etc and responded. She states she usually has UTI sxs with her UTIs but this time not. In any event she is much improved from that.  At that time had lots of labs and was told they were ok.       Current Outpatient Medications   Medication Sig Dispense Refill    apixaban (ELIQUIS) 5 mg Tab Take 5 mg by mouth 2 (two) times daily.      acetaminophen (TYLENOL) 500 MG tablet Take 500 mg by mouth 2 (two) times daily.      ascorbic acid, vitamin C, (VITAMIN C) 1000 MG tablet Take 1,000 mg by mouth once daily.      biotin 1 mg Cap Take by mouth.      calcium carbonate (OS-ELIGIO) 600 mg calcium (1,500 mg) Tab Take 600 mg by mouth once.      FLUZONE HIGH-DOSE 2016-17, PF, 180 mcg/0.5 mL Syrg TO BE ADMINISTERED BY PHARMACIST FOR IMMUNIZATION  0    folic acid (FOLVITE) 1 MG tablet TAKE 1 TABLET(1000 MCG) BY MOUTH EVERY DAY 90 tablet 3    latanoprost 0.005 % ophthalmic solution Place 1 drop into both eyes nightly.  12    methotrexate 2.5 MG Tab TAKE 10 TABLETS (25 MG TOTAL) BY MOUTH EVERY 7 DAYS. Requires labs every 3 months ago. 150 tablet 0    methotrexate 2.5 MG Tab Take 10 tablets (25 mg total) by mouth every 7 days. This medication requires periodic lab monitoring. 150 tablet 2    metoprolol tartrate (LOPRESSOR) 50 MG tablet Take 50 mg by mouth 2 (two) times daily.  5    multivitamin (ONE DAILY MULTIVITAMIN) per tablet Take 1 tablet by mouth once daily.      omeprazole (PRILOSEC) 40 MG capsule TAKE 1 CAPSULE(40 MG) BY MOUTH EVERY DAY 90 capsule 0    predniSONE (DELTASONE) 2.5 MG tablet TAKE 1 TABLET(2.5 MG) BY MOUTH EVERY DAY 90 tablet 0    predniSONE (DELTASONE) 2.5 MG tablet Take 1 tablet (2.5 mg total) by mouth 2 (two) times daily. 180 tablet 1    traMADoL (ULTRAM) 50 mg tablet Take 1 tablet (50 mg total) by mouth every 24 hours as needed for Pain. In the process of weaning off. 30 tablet 2    vitamin D 1000 units Tab Take 185 mg by  mouth once daily.       No current facility-administered medications for this visit.           Review of patient's allergies indicates:   Allergen Reactions    Ambien [zolpidem]      Hallucinations     Past Medical History:   Diagnosis Date    *Atrial fibrillation     Acid reflux     Allergy     Anxiety     Arthritis     Deep vein thrombosis     Depression     Pulmonary embolism      Past Surgical History:   Procedure Laterality Date    JOINT REPLACEMENT      knee x4 replacements-2 R, and 2 L knee         Review of Systems   Constitutional: Negative.  Negative for diaphoresis, fatigue, fever and unexpected weight change.   HENT: Positive for tinnitus. Negative for dental problem, mouth sores, nosebleeds, sinus pressure and trouble swallowing.    Eyes: Negative.  Negative for pain, redness, itching and visual disturbance.        Dry eyes   Respiratory: Negative.  Negative for cough, chest tightness, shortness of breath and wheezing.    Cardiovascular: Negative.  Negative for chest pain and leg swelling.   Gastrointestinal: Negative.  Negative for blood in stool, constipation, diarrhea and vomiting.   Endocrine: Negative.    Genitourinary: Positive for enuresis. Negative for dysuria, genital sores, hematuria and urgency.        Overactive bladder; recurrent asymptomatic UTIs.   Musculoskeletal: Positive for back pain and gait problem. Negative for arthralgias, joint swelling, myalgias, neck pain and neck stiffness.   Skin: Negative.  Negative for pallor and rash.        Ecchymoses  Dry skin   Allergic/Immunologic: Negative.    Neurological: Negative for dizziness, weakness, light-headedness and headaches.   Hematological: Bruises/bleeds easily.   Psychiatric/Behavioral: Negative.  Negative for dysphoric mood and sleep disturbance (sleeps very well). The patient is not nervous/anxious.        Family History   Problem Relation Age of Onset    Cancer Mother         lung    Heart disease Father     Kidney  disease Father      Has many grandchildren:  Social History     Tobacco Use    Smoking status: Never Smoker    Smokeless tobacco: Never Used   Substance Use Topics    Alcohol use: No    Drug use: No         Objective:   PE: 3/3/22  [place]    Accompanied by her daughter who is visiting.    There were no vitals taken for this visit.    Was 229 lb on 4/30/19  Physical Exam   Constitutional: She is oriented to person, place, and time. No distress.   HENT:   Head: Normocephalic and atraumatic.   Mouth/Throat: Oropharynx is clear and moist. No oropharyngeal exudate.        Eyes: Pupils are equal, round, and reactive to light. Conjunctivae are normal. No scleral icterus.   Neck: No JVD present. No tracheal deviation present. No thyromegaly present.   Cardiovascular: Normal rate, regular rhythm and normal heart sounds. Exam reveals no gallop and no friction rub.   No murmur heard.  Pulmonary/Chest: Effort normal and breath sounds normal. No respiratory distress. She has no wheezes. She has no rales. She exhibits no tenderness.   Abdominal: Soft. She exhibits no distension and no mass. There is no splenomegaly or hepatomegaly. There is no abdominal tenderness. There is no rebound and no guarding.   Musculoskeletal:         General: Deformity (RA hand changes) present. No tenderness.      Cervical back: Neck supple.      Comments: Lumbar spine no midline tenderness.  No synovitis anywhere.  Still bilateral crepitus TMJ. Mild decrease in   rotation of the cervical spine. Shoulders are unremarkable. Elbows with mild R flexion contraction; minimal decrease in extension on L; Left wrist & 2,3 & 4th joints MCPs minimal SHT; PIPs with prominent Fam nodes and zigzagging. DIPs with prominent Heberden nodes and zigzagging.  Right hip with scar and with limitation of internal, external, rotation, flexion. Left also with some limitation to lesser degree.Knees with bilateral scars and bony hypertrophy and   crepitus with no  synovitis. Ankles are with limited motion. No tenderness. Heels and toes are without metatarsalgia.    Lymphadenopathy:     She has no cervical adenopathy.   Neurological: She is alert and oriented to person, place, and time. No cranial nerve deficit. She exhibits normal muscle tone.   Cannot get up from a chair without using arms.  R quad appears atrophied as compared to L    Skin: Skin is warm and dry. She is not diaphoretic.   Psychiatric: Mood, memory, affect and judgment normal.               LABS:   7/21/21:[facility]: ESR 15; CRP 1.1 (0.5); CBC ok; CMP Ca 10.8; cnne 0.58; glu 107  4/20/21:[facility]: ESR 9; CRP <.3; CBC ok; CMP ok;   12/19/20: CMP Na 145; glu 105; Ca 10.4;   8/25/20 ([facility]) ESR 31 (40); CRP 6 (10); CBC ok; CMP ok  1/7/20: CBC ok;   12/19/19:CRP 2.4; CMP glu 105; Ca 10.4  9/19/19: ESR 12; CRP 8 (10); CBC ok; CMP Ca 10.5;   4/22/19 ([facility]): ESR 14; CRP 5.3(4.9); CBC ok; CMP ok;  11/19/18: ([facility]): ESR 11; CRP 4.6 (4.9); CBC ok; CMP glu 100  8/1/18 ([facility]): ESR 8; CRP 4.9 (4.9); CBC ok; hi indices; CMP ok  1/2/18: ESR 14;CRP 3.3; CBC ok; CMP ok  8/16/17: ESR 36; CRP 46.3; CBC ok; CMP  5/19/17: Vit D 48;   2/13/17: ESR 25; CRP 36.8; CBC eos 10.1%; CMP ok;   11/1/16: ESR 12; CRP 4.4; CBC ok; CMP ok;   7/26/16: CPR 11.9; CBC ok; CMP ok;  1/6/16: ESR 23; CRP 5.7; Hg 11.6; CMP ok;   3/17/15: ESR 25; CRP 14.7; Hg 11; Ht 34.7; Alb. 3.3  12/17/14: ESR18;CRP 4; CBC ok; CMP ok; Vit D 23  9/17/14: ESR 15; CRP 4.9; CBC ok; CMP ok;   6/18/14: ESR 22; CRP 3.7; Vit D 26; +LAC; neg aCLA & beta 2 glyco;   12/9/13: ESR 30; CRP 20.5; CBC, CMP ok; Vit D 31;   9/26/23: +LAC  9/23/13: ESR 10; CRP 4.4; CBC, CMP ok;  6/20/13: ESR 30; CRP 18.4; CBC, CMP ok;   LAC neg; HEX +; aCLA & beta 2 neg;          3/10/20: DIS DXA: TLS 0.3; TTH -2.2 (14.3% decrease); hi fx risk. R forearm -4.4;    12/15/17 DIS DXA: TLS 0; TFN -1.5; TTH-1.3;   1/20/15 DIS DXA: TLS 1.1; TFN -0.6; TTH -0.7    1/15/18 (DIS): T spine 3 views:  compression fx w anterior wedging of the T11 vertebral body; atherosclerotic disease; rotatory scoliosis & spondylosis of the osteopenic/osteoporotic thoracic & upper lumbar spine.  6/18/14: Arthritis Survey personally reviewed: OA & RA with Bilateral TKRs with ? loosening L TKR;   MRI from 12/12: Extensive pannus in the right hip joint, suggesting rheumatoid arthritis. There is prominent associated marrow edema within the femoral head and a portion of the acetabulum. Small focus of decreased T1/T2 signal in the right femoral head, suggesting avascular necrosis (less than 25% involvement of the femoral head). Mild pannus in the left hip joint. No MR findings of osteonecrosis in the left femoral head.    Assessment:   Seropositive and CCP positive rheumatoid arthritis involving hands--very stable,    feet, elbows, and wrists,    well controlled on prednisone 5 mg/d, MTX 15/wk and folic acid 1 mg/d.   Instructed pt to try and cut down prednisone alternating 5mg and 2.5mg   Increased MTX to 25 mg/wk for better control of RA   Off Enbrel since 12/14.    Had multiple UTIs on it & cellulitis of one of her legs.   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     No hx of miscarriages or other thrombotic event   +LAC (neg aCLA & beta 2 glyc);      On coumadin - INR checks q3 weeks; off xarelto due to cost.    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 R forearm -4.4;     12/15/17 DIS DXA: TLS 0; TFN -1.5; TTH-1.3;    1/20/15 DIS DXA: TLS 1.1; TFN -0.6; TTH -0.7    Hx of vitamin D insufficiency--recurrent;     Pedal edema   Due to  venous insufficiency & weight.     Generalized osteoarthritis:   Low back pain secondary to degenerative disk disease especially involving L2-L3, but also L3-L4 with scoliosis and retrolisthesis.     Also with sacroiliac area tender points.   Status post bilateral knee replacements with repeat revision on the right January 2010 requiring blood transfusion.    S/P R THR 3/18/14.   Severe degenerative joint disease of the hands.     Fully functional    Depressive disorder--stable    Hypertension.     GERD     Obesity       Plan:   Ok to continue w. MTX 25 mg/wk + folic acid 1 mg/d with labs every 3 months;   She uses a lab near her house and needs External/External Forms--2 sets printed out for now and for June 2022.  Karina will fax forms.  Continue prednisone 2.5 mg every day.Try to take qod if possible.  RTC 6 months as per patient's request

## 2022-02-23 DIAGNOSIS — D84.9 IMMUNOSUPPRESSED STATUS: ICD-10-CM

## 2022-03-03 ENCOUNTER — OFFICE VISIT (OUTPATIENT)
Dept: RHEUMATOLOGY | Facility: CLINIC | Age: 72
End: 2022-03-03
Payer: MEDICARE

## 2022-03-03 DIAGNOSIS — Z79.52 LONG TERM CURRENT USE OF SYSTEMIC STEROIDS: ICD-10-CM

## 2022-03-03 DIAGNOSIS — M05.79 RHEUMATOID ARTHRITIS INVOLVING MULTIPLE SITES WITH POSITIVE RHEUMATOID FACTOR: Primary | ICD-10-CM

## 2022-03-03 DIAGNOSIS — Z79.631 LONG TERM METHOTREXATE USER: ICD-10-CM

## 2022-03-03 DIAGNOSIS — D68.61 ANTI-PHOSPHOLIPID SYNDROME: ICD-10-CM

## 2022-03-03 PROCEDURE — 1160F RVW MEDS BY RX/DR IN RCRD: CPT | Mod: CPTII,95,, | Performed by: INTERNAL MEDICINE

## 2022-03-03 PROCEDURE — 99214 PR OFFICE/OUTPT VISIT, EST, LEVL IV, 30-39 MIN: ICD-10-PCS | Mod: 95,,, | Performed by: INTERNAL MEDICINE

## 2022-03-03 PROCEDURE — 1159F PR MEDICATION LIST DOCUMENTED IN MEDICAL RECORD: ICD-10-PCS | Mod: CPTII,95,, | Performed by: INTERNAL MEDICINE

## 2022-03-03 PROCEDURE — 1159F MED LIST DOCD IN RCRD: CPT | Mod: CPTII,95,, | Performed by: INTERNAL MEDICINE

## 2022-03-03 PROCEDURE — 99214 OFFICE O/P EST MOD 30 MIN: CPT | Mod: 95,,, | Performed by: INTERNAL MEDICINE

## 2022-03-03 PROCEDURE — 1160F PR REVIEW ALL MEDS BY PRESCRIBER/CLIN PHARMACIST DOCUMENTED: ICD-10-PCS | Mod: CPTII,95,, | Performed by: INTERNAL MEDICINE

## 2022-03-16 ENCOUNTER — PATIENT MESSAGE (OUTPATIENT)
Dept: RHEUMATOLOGY | Facility: CLINIC | Age: 72
End: 2022-03-16

## 2022-05-09 DIAGNOSIS — K21.9 GASTROESOPHAGEAL REFLUX DISEASE WITHOUT ESOPHAGITIS: ICD-10-CM

## 2022-05-09 RX ORDER — OMEPRAZOLE 40 MG/1
40 CAPSULE, DELAYED RELEASE ORAL DAILY
Qty: 90 CAPSULE | Refills: 0 | Status: SHIPPED | OUTPATIENT
Start: 2022-05-09

## 2022-06-20 DIAGNOSIS — Z79.631 LONG TERM METHOTREXATE USER: ICD-10-CM

## 2022-06-20 RX ORDER — METHOTREXATE 2.5 MG/1
25 TABLET ORAL
Qty: 150 TABLET | Refills: 2 | OUTPATIENT
Start: 2022-06-20 | End: 2023-06-20

## 2022-06-21 ENCOUNTER — TELEPHONE (OUTPATIENT)
Dept: RHEUMATOLOGY | Facility: CLINIC | Age: 72
End: 2022-06-21

## 2022-06-21 DIAGNOSIS — Z79.631 LONG TERM METHOTREXATE USER: Primary | ICD-10-CM

## 2022-06-21 DIAGNOSIS — Z79.631 LONG TERM METHOTREXATE USER: ICD-10-CM

## 2022-06-21 RX ORDER — METHOTREXATE 2.5 MG/1
25 TABLET ORAL
Qty: 150 TABLET | Refills: 2 | OUTPATIENT
Start: 2022-06-21 | End: 2023-06-21

## 2022-06-22 NOTE — TELEPHONE ENCOUNTER
----- Message from Savanna Gievns MA sent at 6/21/2022  4:07 PM CDT -----  Contact: pt  Pt asked can she complete labs because she is out of medication her f/u appt is in September and she states she can not wait til September   ----- Message -----  From: [place] Colton  Sent: 6/21/2022   3:54 PM CDT  To: Ellie GRIER Staff    Pt is requesting a refill on RX below. Pt states that she has been calling since last week. And have not heard anything back. Pt is almost out of rx please call with can update    methotrexate 2.5 MG Tab        [id number] [place] [address] [address] AT [facility] [address]  [facility] [address]  [facility] [address]  Phone: [phone] Fax: [phone]        Confirmed contact below:  Contact Name:Cata Richelle  Phone Number: [phone]